# Patient Record
Sex: FEMALE | Race: WHITE | NOT HISPANIC OR LATINO | Employment: OTHER | URBAN - METROPOLITAN AREA
[De-identification: names, ages, dates, MRNs, and addresses within clinical notes are randomized per-mention and may not be internally consistent; named-entity substitution may affect disease eponyms.]

---

## 2017-01-18 DIAGNOSIS — F32.0 MILD MAJOR DEPRESSION (H): Primary | ICD-10-CM

## 2017-01-18 NOTE — TELEPHONE ENCOUNTER
Celexa      Last Written Prescription Date: 08/30/2016  Last Fill Quantity: 90, # refills: 0  Last Office Visit with [facility] primary care provider:  10/09/2015        Last PHQ-9 score on record=   PHQ-9 SCORE 5/26/2016   Total Score -   Total Score 0

## 2017-01-23 RX ORDER — CITALOPRAM HYDROBROMIDE 20 MG/1
20 TABLET ORAL DAILY
Qty: 30 TABLET | Refills: 0 | Status: SHIPPED | OUTPATIENT
Start: 2017-01-23 | End: 2017-02-06

## 2017-01-23 NOTE — TELEPHONE ENCOUNTER
Spoke to patient in regards to needing appointment. Patient states understanding. Assisted patient in scheduling appointment for physical/refills with Dr. Esparza 2/6/2016.      Mimi Cat, CMA

## 2017-01-24 NOTE — TELEPHONE ENCOUNTER
Medication is being filled for 1 time refill only due to:  Patient needs to be seen because it has been more than one year since last visit.   Barbie Foy RN

## 2017-02-06 ENCOUNTER — OFFICE VISIT (OUTPATIENT)
Dept: FAMILY MEDICINE | Facility: CLINIC | Age: 37
End: 2017-02-06
Payer: COMMERCIAL

## 2017-02-06 VITALS
DIASTOLIC BLOOD PRESSURE: 59 MMHG | HEART RATE: 84 BPM | BODY MASS INDEX: 21.07 KG/M2 | WEIGHT: 123.4 LBS | TEMPERATURE: 97 F | OXYGEN SATURATION: 98 % | SYSTOLIC BLOOD PRESSURE: 117 MMHG | HEIGHT: 64 IN

## 2017-02-06 DIAGNOSIS — Z23 NEED FOR PROPHYLACTIC VACCINATION AND INOCULATION AGAINST INFLUENZA: ICD-10-CM

## 2017-02-06 DIAGNOSIS — N63.15 BREAST LUMP ON RIGHT SIDE AT 12 O'CLOCK POSITION: ICD-10-CM

## 2017-02-06 DIAGNOSIS — N81.89 PELVIC FLOOR RELAXATION: ICD-10-CM

## 2017-02-06 DIAGNOSIS — Z12.4 CERVICAL CANCER SCREENING: ICD-10-CM

## 2017-02-06 DIAGNOSIS — F33.0 MAJOR DEPRESSIVE DISORDER, RECURRENT EPISODE, MILD (H): ICD-10-CM

## 2017-02-06 DIAGNOSIS — R53.83 FATIGUE, UNSPECIFIED TYPE: ICD-10-CM

## 2017-02-06 DIAGNOSIS — D22.5 ATYPICAL NEVUS OF BACK: ICD-10-CM

## 2017-02-06 DIAGNOSIS — L24.5 IRRITANT CONTACT DERMATITIS DUE TO OTHER CHEMICAL PRODUCTS: ICD-10-CM

## 2017-02-06 DIAGNOSIS — Z00.01 ENCOUNTER FOR GENERAL ADULT MEDICAL EXAMINATION WITH ABNORMAL FINDINGS: Primary | ICD-10-CM

## 2017-02-06 PROCEDURE — 87624 HPV HI-RISK TYP POOLED RSLT: CPT | Performed by: INTERNAL MEDICINE

## 2017-02-06 PROCEDURE — 99212 OFFICE O/P EST SF 10 MIN: CPT | Mod: 25 | Performed by: INTERNAL MEDICINE

## 2017-02-06 PROCEDURE — 99395 PREV VISIT EST AGE 18-39: CPT | Performed by: INTERNAL MEDICINE

## 2017-02-06 PROCEDURE — G0145 SCR C/V CYTO,THINLAYER,RESCR: HCPCS | Performed by: INTERNAL MEDICINE

## 2017-02-06 RX ORDER — TRIAMCINOLONE ACETONIDE 1 MG/G
CREAM TOPICAL
Qty: 80 G | Refills: 3 | Status: SHIPPED | OUTPATIENT
Start: 2017-02-06 | End: 2018-03-09

## 2017-02-06 RX ORDER — BETAMETHASONE DIPROPIONATE 0.5 MG/G
CREAM TOPICAL
Qty: 90 G | Refills: 1 | Status: CANCELLED | OUTPATIENT
Start: 2017-02-06

## 2017-02-06 RX ORDER — CITALOPRAM HYDROBROMIDE 20 MG/1
20 TABLET ORAL DAILY
Qty: 90 TABLET | Refills: 3 | Status: SHIPPED | OUTPATIENT
Start: 2017-02-06 | End: 2018-03-09

## 2017-02-06 NOTE — MR AVS SNAPSHOT
After Visit Summary   2/6/2017    Shruthi Bedoya    MRN: [id number]           Patient Information     Date Of Birth          1980        Visit Information        Provider Department      2/6/2017 6:15 PM Julianne Esparza MD [facility]        Today's Diagnoses     Routine general medical examination at a health care facility    -  1     Major depressive disorder, recurrent episode, mild (H)         Irritant contact dermatitis due to other chemical products         Need for prophylactic vaccination and inoculation against influenza         Pelvic floor relaxation         Cervical cancer screening         Fatigue, unspecified type         Breast lump on right side at 12 o'clock position         Atypical nevus of back           Care Instructions    For bone health you could take a Vitamin D supplement- 1000 MG over the counter.  Free weight and other weight bearing exercises are also good for bone health.  Schedule a mammogram/ultrasound with [facility] at [phone].  Schedule an appointment with Dermatology.      Preventive Health Recommendations  Female Ages 26 - 39  Yearly exam:   See your health care provider every year in order to    Review health changes.     Discuss preventive care.      Review your medicines if you your doctor has prescribed any.    Until age 30: Get a Pap test every three years (more often if you have had an abnormal result).    After age 30: Talk to your doctor about whether you should have a Pap test every 3 years or have a Pap test with HPV screening every 5 years.   You do not need a Pap test if your uterus was removed (hysterectomy) and you have not had cancer.  You should be tested each year for STDs (sexually transmitted diseases), if you're at risk.   Talk to your provider about how often to have your cholesterol checked.  If you are at risk for diabetes, you should have a diabetes test (fasting glucose).  Shots: Get a flu shot each year.  Get a tetanus shot every 10 years.   Nutrition:     Eat at least 5 servings of fruits and vegetables each day.    Eat whole-grain bread, whole-wheat pasta and brown rice instead of white grains and rice.    Talk to your provider about Calcium and Vitamin D.     Lifestyle    Exercise at least 150 minutes a week (30 minutes a day, 5 days of the week). This will help you control your weight and prevent disease.    Limit alcohol to one drink per day.    No smoking.     Wear sunscreen to prevent skin cancer.    See your dentist every six months for an exam and cleaning.    [facility]    If you have any questions regarding to your visit please contact your care team:     Team Pink:   Clinic Hours Telephone Number   Internal Medicine:  Dr. Julianne Monroy NP       7am-7pm  Monday - Thursday   7am-5pm  Fridays  [phone]  (Appointment scheduling available 24/7)    Questions about your visit?  Team Line  [phone]   Urgent Care - [place] and [place] - 11am-9pm Monday-Friday Saturday-Sunday- 9am-5pm   [place] - 5pm-9pm Monday-Friday Saturday-Sunday- 9am-5pm  [phone] - [place]   [phone] - [place]       What options do I have for visits at the clinic other than the traditional office visit?  To expand how we care for you, many of our providers are utilizing electronic visits (e-visits) and telephone visits, when medically appropriate, for interactions with their patients rather than a visit in the clinic.   We also offer nurse visits for many medical concerns. Just like any other service, we will bill your insurance company for this type of visit based on time spent on the phone with your provider. Not all insurance companies cover these visits. Please check with your medical insurance if this type of visit is covered. You will be responsible for any charges that are not paid by your insurance.      E-visits via Blippex:  generally incur  a $35.00 fee.  Telephone visits:  Time spent on the phone: *charged based on time that is spent on the phone in increments of 10 minutes. Estimated cost:   5-10 mins $30.00   11-20 mins. $59.00   21-30 mins. $85.00   Use Visiarchart (secure email communication and access to your chart) to send your primary care provider a message or make an appointment. Ask someone on your Team how to sign up for [email or web address].    For a Price Quote for your services, please call our Consumer Price Line at [phone].    As always, Thank you for trusting us with your health care needs!    Discharged by Gianna ALBERTS CMA ([facility])          Follow-ups after your visit        Additional Services     DERMATOLOGY REFERRAL       Your provider has referred you to: [facility]: [facility] [place] [phone]   [email or web address]  [facility] [phone]   [email or web address]  [place] (2 locations) [phone]   [email or web address]  [place] [phone]   [email or web address]    Please be aware that coverage of these services is subject to the terms and limitations of your health insurance plan.  Call member services at your health plan with any benefit or coverage questions.      Please bring the following with you to your appointment:    (1) Any X-Rays, CTs or MRIs which have been performed.  Contact the facility where they were done to arrange for  prior to your scheduled appointment.    (2) List of current medications  (3) This referral request   (4) Any documents/labs given to you for this referral            [facility] PT, HAND, AND CHIROPRACTIC REFERRAL       **This order will print in the [facility] Scheduling Office**    Physical Therapy, Hand Therapy and Chiropractic Care are available through:    *[facility]  *[facility] [place]  *[facility]    Call one number to schedule at any of the  above locations: [phone].    Your provider has referred you to: Physical Therapy at [facility] or [facility]    Indication/Reason for Referral: Women's Health (Please Complete Special Programs SmartList)  Onset of Illness:   Therapy Orders: Evaluate and Treat  Special Programs: Women's Health: Pelvic Floor Weakness:  and  Biofeedback and Strengthening  Special Request: as needed    Naima Mcnamara      Additional Comments for the Therapist or Chiropractor:     Please be aware that coverage of these services is subject to the terms and limitations of your health insurance plan.  Call member services at your health plan with any benefit or coverage questions.      Please bring the following to your appointment:    *Your personal calendar for scheduling future appointments  *Comfortable clothing                  Future tests that were ordered for you today     Open Future Orders        Priority Expected Expires Ordered    MA Diagnostic Digital Bilateral Routine  2/6/2018 2/6/2017    US Breast Right Limited 1-3 Quadrants Routine  2/6/2018 2/6/2017            Who to contact     If you have questions or need follow up information about today's clinic visit or your schedule please contact [facility] directly at [phone].  Normal or non-critical lab and imaging results will be communicated to you by MedAware Systemshart, letter or phone within 4 business days after the clinic has received the results. If you do not hear from us within 7 days, please contact the clinic through MedAware Systemshart or phone. If you have a critical or abnormal lab result, we will notify you by phone as soon as possible.  Submit refill requests through LEAFER or call your pharmacy and they will forward the refill request to us. Please allow 3 business days for your refill to be completed.          Additional Information About Your Visit        LEAFER Information     LEAFER gives you secure access to your electronic health record. If you see a primary care  "provider, you can also send messages to your care team and make appointments. If you have questions, please call your primary care clinic.  If you do not have a primary care provider, please call [phone] and they will assist you.        Care EveryWhere ID     This is your Care EveryWhere ID. This could be used by other organizations to access your [place] medical records  [id number]        Your Vitals Were     Pulse Temperature Height    84 97  F (36.1  C) (Oral) 5' 4\" (1.626 m)    BMI (Body Mass Index) Pulse Oximetry Last Period    21.17 kg/m2 98% 01/20/2017 (Approximate)    Breastfeeding?          No         Blood Pressure from Last 3 Encounters:   02/06/17 117/59   10/09/15 101/55   04/21/14 101/62    Weight from Last 3 Encounters:   02/06/17 123 lb 6.4 oz (55.974 kg)   10/09/15 120 lb (54.432 kg)   04/21/14 117 lb 3.2 oz (53.162 kg)              We Performed the Following     DEPRESSION ACTION PLAN (DAP) Order [[id number]]     DERMATOLOGY REFERRAL     HPV High Risk Types DNA Cervical     [facility] PT, HAND, AND CHIROPRACTIC REFERRAL     Pap imaged thin layer screen with HPV - recommended age 30 - 65 years (select HPV order below)          Today's Medication Changes          These changes are accurate as of: 2/6/17  7:02 PM.  If you have any questions, ask your nurse or doctor.               Stop taking these medicines if you haven't already. Please contact your care team if you have questions.     betamethasone dipropionate 0.05 % cream   Commonly known as:  DIPROSONE   Stopped by:  Julianne Esparza MD                Where to get your medicines      These medications were sent to [facility]/pharmacy #[id number] - [place], [place] - [address]  [address] [place], [place] [address]     Phone:  [phone]    - citalopram 20 MG tablet  - triamcinolone 0.1 % cream             Primary Care Provider Office Phone # Fax #    Julianne Esparza [phone]       [address] " [place] [address]        Thank you!     Thank you for choosing [facility]  for your care. Our goal is always to provide you with excellent care. Hearing back from our patients is one way we can continue to improve our services. Please take a few minutes to complete the written survey that you may receive in the mail after your visit with us. Thank you!             Your Updated Medication List - Protect others around you: Learn how to safely use, store and throw away your medicines at www.disposemymeds.org.          This list is accurate as of: 2/6/17  7:02 PM.  Always use your most recent med list.                   Brand Name Dispense Instructions for use    citalopram 20 MG tablet    celeXA    90 tablet    Take 1 tablet (20 mg) by mouth daily       triamcinolone 0.1 % cream    KENALOG    80 g    Apply sparingly to affected area three times daily as needed       TYLENOL 325 MG tablet   Generic drug:  acetaminophen      2 TABLETS EVERY 4 HOURS AS NEEDED

## 2017-02-06 NOTE — LETTER
[facility]  [address] [place] [address]  [place] [address]  [phone]      February 14, 2017    Shruthi Bedoya  [address] [place] [id number]    Dear Shruthi,  We are happy to inform you that your PAP smear result from 2/6/17 is normal.  We are now able to do a follow up test on PAP smears. The DNA test is for HPV (Human Papilloma Virus). Cervical cancer is closely linked with certain types of HPV. Your result showed no evidence of high risk HPV.  Therefore we recommend you return in 3 years for your next pap smear and HPV test.  You will still need to return to the clinic every year for an annual exam and other preventive tests.  Please contact the clinic with any questions.  Sincerely,  Julianne Esparza MD/cherry

## 2017-02-07 ASSESSMENT — PATIENT HEALTH QUESTIONNAIRE - PHQ9: SUM OF ALL RESPONSES TO PHQ QUESTIONS 1-9: 0

## 2017-02-07 NOTE — PROGRESS NOTES
INTERNAL MEDICINE     SUBJECTIVE:     CC: Shruthi Bedoya is an 36 year old woman who presents for preventive health visit.     Healthy Habits:    Do you get at least three servings of calcium containing foods daily (dairy, green leafy vegetables, etc.)? no    Amount of exercise or daily activities, outside of work: none     Problems taking medications regularly No    Medication side effects: No    Have you had an eye exam in the past two years? yes    Do you see a dentist twice per year? yes    Do you have sleep apnea, excessive snoring or daytime drowsiness?no      Osteoporosis - Mom was diagnosed with osteoporosis, curious what she can take preventative wise? She is not doing intentional exercise but is on her feet a lot at work. She has cut back on soda significantly and her stomach feels a lot better without it.     Eczema - Her eczema is acting up again on her hands and legs, she would like a refill on the cream she previously was prescribed. She knows work causes it to flare up with all the hair dye and chemicals.     Bladder control - Her bladder control remains the same, she was unable to follow up with therapy. Notes she still has pain. She would like the option to consult a therapist but needs to think on it.    Notes  - Patient has a lump on her breast.  Last time it was a cyst but would just like to double check.   - Denies any abnormal vaginal bleeding.   - Feels increased fatigue.         Today's PHQ-2 Score:   PHQ-2 ( 1999 Pf[employer]) 2/6/2017 10/9/2015   Q1: Little interest or pleasure in doing things 0 0   Q2: Feeling down, depressed or hopeless 0 0   PHQ-2 Score 0 0     Abuse: Current or Past(Physical, Sexual or Emotional)- No  Do you feel safe in your environment - Yes    Social History   Substance Use Topics     Smoking status: Former Smoker     Types: Cigarettes     Quit date: 04/06/2016     Smokeless tobacco: Never Used      Comment: 6 cigarettes daily (10/9/15)     Alcohol Use: No     The  patient does not drink >3 drinks per day nor >7 drinks per week.    Recent Labs   Lab Test  10/03/14   1017   CHOL  150   HDL  49*   LDL  91   TRIG  51   CHOLHDLRATIO  3.1   Reviewed orders with patient.  Reviewed health maintenance and updated orders accordingly - Yes    Mammo Decision Support:  Mammogram not appropriate for this patient based on age.  Pertinent mammograms are reviewed under the imaging tab.  History of abnormal Pap smear: YES - age 30- 65 PAP every 3 years recommended  Last 3 Pap Results:   PAP (no units)   Date Value   04/21/2014 NIL   05/02/2011 NIL   All Histories reviewed and updated in Epic.      ROS:  C: NEGATIVE for fever, chills, change in weight  I: NEGATIVE for worrisome rashes, moles or lesions. POSITIVE for eczema on her hands  E: NEGATIVE for vision changes or irritation  ENT: NEGATIVE for ear, mouth and throat problems  R: NEGATIVE for significant cough or SOB  B: NEGATIVE for masses, tenderness or discharge. POSITIVE for breast lump  CV: NEGATIVE for chest pain, palpitations or peripheral edema  GI: NEGATIVE for nausea, abdominal pain, heartburn, or change in bowel habits  : NEGATIVE for unusual urinary or vaginal symptoms. Periods are regular. POSITIVE for mixed incontinence urge and stress   M: NEGATIVE for significant arthralgias or myalgia  N: NEGATIVE for weakness, dizziness or paresthesias  P: NEGATIVE for changes in mood or affect    This document serves as a record of the services and decisions personally performed and made by Julianne Esparza MD. It was created on his/her behalf by Lyubov Pacheco, trained medical scribe. The creation of this document is based the provider's statements to the medical scribes.    Scribjulio cesar Pacheco 6:26 PM, February 6, 2017    Problem list, Medication list, Allergies, and Medical/Social/Surgical histories reviewed in [facility] and updated as appropriate.  Labs reviewed in EPIC  OBJECTIVE:     /59 mmHg  Pulse 84  Temp(Src) 97  F (36.1  " C) (Oral)  Ht 1.626 m (5' 4\")  Wt 55.974 kg (123 lb 6.4 oz)  BMI 21.17 kg/m2  SpO2 98%  LMP 01/20/2017 (Approximate)  Breastfeeding? No  EXAM:   GENERAL: healthy, alert and no distress  EYES: Eyes grossly normal to inspection, PERRL and conjunctivae and sclerae normal  HENT: ear canals and TM's normal, nose and mouth without ulcers or lesions  NECK: no adenopathy, no asymmetry, masses, or scars and thyroid normal to palpation  RESP: lungs clear to auscultation - no rales, rhonchi or wheezes  BREAST: without tenderness or nipple discharge and no palpable axillary masses or adenopathy on the left . Firm ridge at the 12 o'clock position under neath the nipple on the right side  CV: regular rate and rhythm, normal S1 S2, no S3 or S4, no murmur, click or rub, no peripheral edema and peripheral pulses strong  ABDOMEN: soft, nontender, no hepatosplenomegaly, no masses and bowel sounds normal   (female): normal female external genitalia, normal urethral meatus, vaginal mucosa pink, moist, well rugated, and normal cervix/adnexa/uterus without masses or discharge  erythema of the cervix with post operative changes  MS: no gross musculoskeletal defects noted, no edema  SKIN: no suspicious lesions or rashes. Erythematous papules and plaques on the right lower leg and bilateral hands. Atypical nevous on her mid back- black and oblong  NEURO: Normal strength and tone, mentation intact and speech normal  PSYCH: mentation appears normal, affect normal/bright    ASSESSMENT/PLAN:     1. Routine general medical examination at a health care facility  -- PAP     2. Major depressive disorder, recurrent episode, mild (H)  Well controlled. The current medical regimen is effective;  continue present plan and medications.  - citalopram (CELEXA) 20 MG tablet; Take 1 tablet (20 mg) by mouth daily  Dispense: 90 tablet; Refill: 3    3. Irritant contact dermatitis due to other chemical products  She has eczema on her bilateral hands and " "right leg. Prescribed triamcinolone cream.   - triamcinolone (KENALOG) 0.1 % cream; Apply sparingly to affected area three times daily as needed  Dispense: 80 g; Refill: 3    5. Pelvic floor relaxation  Patient never followed up with therapy for her mixed urinary incontinence/stress/urge. She expressed she would like the option to do so in the future.  - [facility] PT, HAND, AND CHIROPRACTIC REFERRAL    6. Cervical cancer screening  Hx of abnormal PAP. HPV screening today with PAP  - Pap imaged thin layer screen with HPV - recommended age 30 - 65 years (select HPV order below)  - HPV High Risk Types DNA Cervical    7. Fatigue, unspecified type  Offered labs but patient declined at this time.  Would do hemoglobin and tsh if patient reconsiders.    8. Breast lump on right side at 12 o'clock position  She will schedule a mammogram for further assessment and diagnosis.   - MA Diagnostic Digital Bilateral; Future  - US Breast Right Limited 1-3 Quadrants; Future    9. Atypical nevus of back  She will schedule with derm for further assessment and diagnosis. I advised this at her last physical but she didn't go.  Differential includes melanoma and patient is aware.  - DERMATOLOGY REFERRAL      COUNSELING:   Reviewed preventive health counseling, as reflected in patient instructions       Regular exercise       Healthy diet/nutrition       Osteoporosis Prevention/Bone Health   reports that she quit smoking about 10 months ago. Her smoking use included Cigarettes. She has never used smokeless tobacco.  Estimated body mass index is 21.17 kg/(m^2) as calculated from the following:    Height as of this encounter: 1.626 m (5' 4\").    Weight as of this encounter: 55.974 kg (123 lb 6.4 oz).     Counseling Resources:  ATP IV Guidelines  Pooled Cohorts Equation Calculator  Breast Cancer Risk Calculator  FRAX Risk Assessment  ICSI Preventive Guidelines  Dietary Guidelines for Americans, 2010  USDA's MyPlate  ASA Prophylaxis  Lung CA " Screening    Patient Instructions     For bone health you could take a Vitamin D supplement- 1000 MG over the counter.  Free weight and other weight bearing exercises are also good for bone health.  Schedule a mammogram/ultrasound with [facility] at [phone].  Schedule an appointment with Dermatology.      Preventive Health Recommendations  Female Ages 26 - 39  Yearly exam:   See your health care provider every year in order to    Review health changes.     Discuss preventive care.      Review your medicines if you your doctor has prescribed any.    Until age 30: Get a Pap test every three years (more often if you have had an abnormal result).    After age 30: Talk to your doctor about whether you should have a Pap test every 3 years or have a Pap test with HPV screening every 5 years.   You do not need a Pap test if your uterus was removed (hysterectomy) and you have not had cancer.  You should be tested each year for STDs (sexually transmitted diseases), if you're at risk.   Talk to your provider about how often to have your cholesterol checked.  If you are at risk for diabetes, you should have a diabetes test (fasting glucose).  Shots: Get a flu shot each year. Get a tetanus shot every 10 years.   Nutrition:     Eat at least 5 servings of fruits and vegetables each day.    Eat whole-grain bread, whole-wheat pasta and brown rice instead of white grains and rice.    Talk to your provider about Calcium and Vitamin D.     Lifestyle    Exercise at least 150 minutes a week (30 minutes a day, 5 days of the week). This will help you control your weight and prevent disease.    Limit alcohol to one drink per day.    No smoking.     Wear sunscreen to prevent skin cancer.    See your dentist every six months for an exam and cleaning.    [facility]    If you have any questions regarding to your visit please contact your care team:     Team Pink:   Clinic Hours Telephone Number   Internal  Medicine:  Dr. Julianne Monroy, NP       7am-7pm  Monday - Thursday   7am-5pm  Fridays  [phone]  (Appointment scheduling available 24/7)    Questions about your visit?  Team Line  [phone]   Urgent Care - [place] and [facility] - 11am-9pm Monday-Friday Saturday-Sunday- 9am-5pm   [place] - 5pm-9pm Monday-Friday Saturday-Sunday- 9am-5pm  [phone] - [place]   [phone] - [place]       What options do I have for visits at the clinic other than the traditional office visit?  To expand how we care for you, many of our providers are utilizing electronic visits (e-visits) and telephone visits, when medically appropriate, for interactions with their patients rather than a visit in the clinic.   We also offer nurse visits for many medical concerns. Just like any other service, we will bill your insurance company for this type of visit based on time spent on the phone with your provider. Not all insurance companies cover these visits. Please check with your medical insurance if this type of visit is covered. You will be responsible for any charges that are not paid by your insurance.      E-visits via [employer]:  generally incur a $35.00 fee.  Telephone visits:  Time spent on the phone: *charged based on time that is spent on the phone in increments of 10 minutes. Estimated cost:   5-10 mins $30.00   11-20 mins. $59.00   21-30 mins. $85.00   Use [employer] (secure email communication and access to your chart) to send your primary care provider a message or make an appointment. Ask someone on your Team how to sign up for [employer].    For a Price Quote for your services, please call our Consumer Price Line at [phone].    As always, Thank you for trusting us with your health care needs!    Discharged by Gianna ALBERTS CMA ([facility])          I spent 26 minutes of time with the patient and >50% of it was in education and counseling regarding preventative health.      The information in this document, created by the medical scribe for me, accurately reflects the services I personally performed and the decisions made by me. I have reviewed and approved this document for accuracy prior to leaving the patient care area.  Julianne Esparza MD  6:28 PM, 02/06/2017    Julianne Esparza MD  [facility]    Start 6:35 PM  End 7:01 PM

## 2017-02-07 NOTE — PATIENT INSTRUCTIONS
For bone health you could take a Vitamin D supplement- 1000 MG over the counter.  Free weight and other weight bearing exercises are also good for bone health.  Schedule a mammogram/ultrasound with [facility] at [phone].  Schedule an appointment with Dermatology.      Preventive Health Recommendations  Female Ages 26 - 39  Yearly exam:   See your health care provider every year in order to    Review health changes.     Discuss preventive care.      Review your medicines if you your doctor has prescribed any.    Until age 30: Get a Pap test every three years (more often if you have had an abnormal result).    After age 30: Talk to your doctor about whether you should have a Pap test every 3 years or have a Pap test with HPV screening every 5 years.   You do not need a Pap test if your uterus was removed (hysterectomy) and you have not had cancer.  You should be tested each year for STDs (sexually transmitted diseases), if you're at risk.   Talk to your provider about how often to have your cholesterol checked.  If you are at risk for diabetes, you should have a diabetes test (fasting glucose).  Shots: Get a flu shot each year. Get a tetanus shot every 10 years.   Nutrition:     Eat at least 5 servings of fruits and vegetables each day.    Eat whole-grain bread, whole-wheat pasta and brown rice instead of white grains and rice.    Talk to your provider about Calcium and Vitamin D.     Lifestyle    Exercise at least 150 minutes a week (30 minutes a day, 5 days of the week). This will help you control your weight and prevent disease.    Limit alcohol to one drink per day.    No smoking.     Wear sunscreen to prevent skin cancer.    See your dentist every six months for an exam and cleaning.    [facility]    If you have any questions regarding to your visit please contact your care team:     Team Pink:   Clinic Hours Telephone Number   Internal Medicine:  Dr. Julianne Canela  Zoraida Monroy NP       7am-7pm  Monday - Thursday   7am-5pm  Fridays  [phone]  (Appointment scheduling available 24/7)    Questions about your visit?  Team Line  [phone]   Urgent Care - [place] - 11am-9pm Monday-Friday Saturday-Sunday- 9am-5pm   [place] - 5pm-9pm Monday-Friday Saturday-Sunday- 9am-5pm  [phone] - [place]   [phone] - [place]       What options do I have for visits at the clinic other than the traditional office visit?  To expand how we care for you, many of our providers are utilizing electronic visits (e-visits) and telephone visits, when medically appropriate, for interactions with their patients rather than a visit in the clinic.   We also offer nurse visits for many medical concerns. Just like any other service, we will bill your insurance company for this type of visit based on time spent on the phone with your provider. Not all insurance companies cover these visits. Please check with your medical insurance if this type of visit is covered. You will be responsible for any charges that are not paid by your insurance.      E-visits via [employer]:  generally incur a $35.00 fee.  Telephone visits:  Time spent on the phone: *charged based on time that is spent on the phone in increments of 10 minutes. Estimated cost:   5-10 mins $30.00   11-20 mins. $59.00   21-30 mins. $85.00   Use [employer] (secure email communication and access to your chart) to send your primary care provider a message or make an appointment. Ask someone on your Team how to sign up for [employer].    For a Price Quote for your services, please call our Consumer Price Line at [phone].    As always, Thank you for trusting us with your health care needs!    Discharged by Gianna ALBERTS CMA ([facility])

## 2017-02-07 NOTE — NURSING NOTE
"Chief Complaint   Patient presents with     Physical       Initial /59 mmHg  Pulse 84  Temp(Src) 97  F (36.1  C) (Oral)  Ht 5' 4\" (1.626 m)  Wt 123 lb 6.4 oz (55.974 kg)  BMI 21.17 kg/m2  SpO2 98%  LMP 01/20/2017 (Approximate)  Breastfeeding? No Estimated body mass index is 21.17 kg/(m^2) as calculated from the following:    Height as of this encounter: 5' 4\" (1.626 m).    Weight as of this encounter: 123 lb 6.4 oz (55.974 kg).  Medication Reconciliation: complete   Gianna ALBERTS CMA ([facility])      "

## 2017-02-08 LAB
COPATH REPORT: NORMAL
PAP: NORMAL

## 2017-02-10 ENCOUNTER — TRANSFERRED RECORDS (OUTPATIENT)
Dept: HEALTH INFORMATION MANAGEMENT | Facility: CLINIC | Age: 37
End: 2017-02-10

## 2017-02-13 LAB
FINAL DIAGNOSIS: NORMAL
HPV HR 12 DNA CVX QL NAA+PROBE: NEGATIVE
HPV16 DNA SPEC QL NAA+PROBE: NEGATIVE
HPV18 DNA SPEC QL NAA+PROBE: NEGATIVE
SPECIMEN DESCRIPTION: NORMAL

## 2017-03-06 DIAGNOSIS — F32.0 MILD MAJOR DEPRESSION (H): ICD-10-CM

## 2017-03-07 NOTE — TELEPHONE ENCOUNTER
Last Written Prescription Date  Last Fill Quantity: ***, # refills: ***  [facility] Office Visit with [facility] [place] care provider:  ***        Last PHQ-9 score on record=   PHQ-9 SCORE 2/6/2017   Total Score -   Total Score 0

## 2017-03-08 RX ORDER — CITALOPRAM HYDROBROMIDE 20 MG/1
TABLET ORAL
Qty: 30 TABLET | Refills: 0 | OUTPATIENT
Start: 2017-03-08

## 2018-02-26 ENCOUNTER — TELEPHONE (OUTPATIENT)
Dept: INTERNAL MEDICINE | Facility: CLINIC | Age: 38
End: 2018-02-26

## 2018-02-26 NOTE — TELEPHONE ENCOUNTER
Per Dr. Esparza: no labs needed at this time.     Patient updated.  Lab appointment cancelled. She will still keep her physical appointment    Juan Francisco Mckenna RN

## 2018-02-26 NOTE — TELEPHONE ENCOUNTER
Patient would like to discuss weaning off of Citalopram to see how she does without the med.    Noted that she has not been seen for over 1 year.  Advised her to schedule    Physical scheduled for 3/9/18 and she will ask provider about citalopram then  She has the day off and would like to do fasting labs earlier that same day, then she will come back for the OV as she did not want to fast until the OV    Please place any previsit labs needed.    Juan Francisco Mckenna RN

## 2018-02-26 NOTE — TELEPHONE ENCOUNTER
Reason for call:  Medication   If this is a refill request, has the caller requested the refill from the pharmacy already?   Will the patient be using a [facility]?   Name of the pharmacy and phone number for the current request:     Name of the medication requested:Citalapram    Other request Patient has questions about medicine.    Phone number to reach patient:  Cell number on file:    Telephone Information:   Mobile [phone]       Best Time:  Any      Can we leave a detailed message on this number?  YES  Carolyn Portillo,

## 2018-03-09 ENCOUNTER — OFFICE VISIT (OUTPATIENT)
Dept: INTERNAL MEDICINE | Facility: CLINIC | Age: 38
End: 2018-03-09
Payer: COMMERCIAL

## 2018-03-09 VITALS
OXYGEN SATURATION: 96 % | TEMPERATURE: 97.5 F | WEIGHT: 123.2 LBS | BODY MASS INDEX: 20.53 KG/M2 | RESPIRATION RATE: 16 BRPM | SYSTOLIC BLOOD PRESSURE: 100 MMHG | DIASTOLIC BLOOD PRESSURE: 60 MMHG | HEIGHT: 65 IN | HEART RATE: 90 BPM

## 2018-03-09 DIAGNOSIS — F32.0 MILD MAJOR DEPRESSION (H): ICD-10-CM

## 2018-03-09 DIAGNOSIS — L24.5 IRRITANT CONTACT DERMATITIS DUE TO OTHER CHEMICAL PRODUCTS: ICD-10-CM

## 2018-03-09 DIAGNOSIS — Z86.39 HISTORY OF IRON DEFICIENCY: ICD-10-CM

## 2018-03-09 DIAGNOSIS — R53.83 FATIGUE, UNSPECIFIED TYPE: ICD-10-CM

## 2018-03-09 DIAGNOSIS — Z00.00 ROUTINE GENERAL MEDICAL EXAMINATION AT A HEALTH CARE FACILITY: Primary | ICD-10-CM

## 2018-03-09 PROCEDURE — 99395 PREV VISIT EST AGE 18-39: CPT | Performed by: INTERNAL MEDICINE

## 2018-03-09 RX ORDER — TRIAMCINOLONE ACETONIDE 1 MG/G
CREAM TOPICAL
Qty: 80 G | Refills: 3 | Status: SHIPPED | OUTPATIENT
Start: 2018-03-09 | End: 2018-12-03

## 2018-03-09 ASSESSMENT — PAIN SCALES - GENERAL: PAINLEVEL: NO PAIN (0)

## 2018-03-09 NOTE — NURSING NOTE
"Chief Complaint   Patient presents with     Physical     Discuss Citalopram, patient is not fasting     Health Maintenance     DAP/PHQ9       Initial /60 (BP Location: Left arm, Cuff Size: Adult Regular)  Pulse 90  Temp 97.5  F (36.4  C) (Oral)  Resp 16  Ht 5' 4.75\" (1.645 m)  Wt 123 lb 3.2 oz (55.9 kg)  LMP 03/02/2018 (Approximate)  SpO2 96%  Breastfeeding? No  BMI 20.66 kg/m2 Estimated body mass index is 20.66 kg/(m^2) as calculated from the following:    Height as of this encounter: 5' 4.75\" (1.645 m).    Weight as of this encounter: 123 lb 3.2 oz (55.9 kg).  Medication Reconciliation: complete       Mimi Cat CMA    "

## 2018-03-09 NOTE — PATIENT INSTRUCTIONS
Cut citalopram to half a tablet for a couple weeks. After that, see if you can stop. If you have withdrawal, take it half tab every other day.    Schedule a fasting lab.    Yearly mammograms (3D).    [facility]    If you have any questions regarding to your visit please contact your care team:     Team Pink:   Clinic Hours Telephone Number   Internal Medicine:  Dr. Julianne Monroy, NP       7am-7pm  Monday - Thursday   7am-5pm  Fridays  [phone]  (Appointment scheduling available 24/7)    Questions about your visit?  Team Line  [phone]   [facility] [address] [facility] - 11am-9pm Monday-Friday Saturday-Sunday- 9am-5pm   [place] - 5pm-9pm Monday-Friday Saturday-Sunday- 9am-5pm  [phone] - [place]   [phone] - [place]       What options do I have for visits at the clinic other than the traditional office visit?  To expand how we care for you, many of our providers are utilizing electronic visits (e-visits) and telephone visits, when medically appropriate, for interactions with their patients rather than a visit in the clinic.   We also offer nurse visits for many medical concerns. Just like any other service, we will bill your insurance company for this type of visit based on time spent on the phone with your provider. Not all insurance companies cover these visits. Please check with your medical insurance if this type of visit is covered. You will be responsible for any charges that are not paid by your insurance.      E-visits via [employer]:  generally incur a $35.00 fee.  Telephone visits:  Time spent on the phone: *charged based on time that is spent on the phone in increments of 10 minutes. Estimated cost:   5-10 mins $30.00   11-20 mins. $59.00   21-30 mins. $85.00   Use [employer] (secure email communication and access to your chart) to send your primary care provider a message or make an appointment. Ask someone on your  Team how to sign up for [employer].    For a Price Quote for your services, please call our Consumer Price Line at [phone].    As always, Thank you for trusting us with your health care needs!    Comfort TORRES CMA ([facility])

## 2018-03-09 NOTE — PROGRESS NOTES
INTERNAL MEDICINE   SUBJECTIVE:   CC: Shruthi Bedoya is an 37 year old woman who presents for preventive health visit.     Physical   Annual:     Getting at least 3 servings of Calcium per day::  NO    Bi-annual eye exam::  Yes    Dental care twice a year::  Yes    Sleep apnea or symptoms of sleep apnea::  Daytime drowsiness    Diet::  Regular (no restrictions)    Frequency of exercise::  None    Taking medications regularly::  Yes    Medication side effects::  None    Additional concerns today::  No          Answers for HPI/ROS submitted by the patient on 3/9/2018   PHQ-2 Score: 0    Depression - Her depression has not prevented her from work. Citalopram has been controlling her depression for a long time since she was young. Wants to get off of citalopram. She has trouble focusing and wants to know if stopping it will help. She is afraid of having withdrawal.    Eczema - Sometimes she scratches her legs because they are so itchy that they bleed. Started using a Cetaphil wash and uses Vanicream daily. Notices itching is the worse after she shaves. Gets some itching on her arms too but mostly on her legs.    Exercise - Starting to do squats but not a lot of cardio.    Shoulder pain - It has been one week since she started having sharp shoulder pain so she wants to see if it will improve on its own. She thinks she needs to see a chiropractor or get a massage.      Additional notes:  She feels tired a lot and has been losing more hair  Denies vaginal discharge or pain  She has been taking bone broth with collagen      Chief Complaint   Patient presents with     Physical     Discuss Citalopram, patient is not fasting     Health Maintenance     DAP/PHQ9     Today's PHQ-2 Score:   PHQ-2 ( 1999 Pfizer) 3/9/2018   Q1: Little interest or pleasure in doing things 0   Q2: Feeling down, depressed or hopeless 0   PHQ-2 Score 0   Q1: Little interest or pleasure in doing things Not at all   Q2: Feeling down, depressed or  "hopeless Not at all   PHQ-2 Score 0     Abuse: Current or Past(Physical, Sexual or Emotional)- No  Do you feel safe in your environment - Yes    Social History   Substance Use Topics     Smoking status: Former Smoker     Types: Cigarettes     Quit date: 4/6/2016     Smokeless tobacco: Never Used      Comment: 6 cigarettes daily (10/9/15)     Alcohol use No     No flowsheet data found.No flowsheet data found.    Reviewed orders with patient.  Reviewed health maintenance and updated orders accordingly - Yes  Labs reviewed in [facility]    Mammogram not appropriate for this patient based on age.  Pertinent mammograms are reviewed under the imaging tab.  History of abnormal Pap smear: YES - updated in Problem List and Health Maintenance accordingly    Reviewed and updated as needed this visit by clinical staff  Tobacco  Allergies  Meds  Med Hx  Surg Hx  Fam Hx  Soc Hx        Reviewed and updated as needed this visit by Provider          Review of Systems  ROS: 10 point ROS neg other than the symptoms noted above in the HPI.    This document serves as a record of the services and decisions personally performed and made by Julianne Esparza MD. It was created on his/her behalf by Selene Borges trained medical scribe. The creation of this document is based the provider's statements to the medical scribes.    Nadia Borges 12:33 PM, March 9, 2018    OBJECTIVE:   /60 (BP Location: Left arm, Cuff Size: Adult Regular)  Pulse 90  Temp 97.5  F (36.4  C) (Oral)  Resp 16  Ht 1.645 m (5' 4.75\")  Wt 55.9 kg (123 lb 3.2 oz)  LMP 03/02/2018 (Approximate)  SpO2 96%  Breastfeeding? No  BMI 20.66 kg/m2  Physical Exam  GENERAL: healthy, alert and no distress  EYES: Eyes grossly normal to inspection, PERRL and conjunctivae and sclerae normal  HENT: ear canals and TM's normal, nose and mouth without ulcers or lesions  NECK: no adenopathy, no asymmetry, masses, or scars and thyroid normal to palpation  RESP: lungs clear to " "auscultation - no rales, rhonchi or wheezes  BREAST: normal without masses, tenderness or nipple discharge and no palpable axillary masses or adenopathy  CV: regular rate and rhythm, normal S1 S2, no S3 or S4, no murmur, click or rub, no peripheral edema and peripheral pulses strong  ABDOMEN: soft, nontender, no hepatosplenomegaly, no masses and bowel sounds normal  MS: no gross musculoskeletal defects noted, no edema, full range of motion right shoulder  SKIN: no suspicious lesions or rashes  NEURO: Normal strength and tone, mentation intact and speech normal  PSYCH: mentation appears normal, affect normal/bright    ASSESSMENT/PLAN:   1. Irritant contact dermatitis due to other chemical products  Controlled. She still has itching but continues with triamcinolone, Cetaphil, and Vanicream.  - triamcinolone (KENALOG) 0.1 % cream; Apply sparingly to affected area three times daily as needed  Dispense: 80 g; Refill: 3    2. Routine general medical examination at a health care facility    - Lipid panel reflex to direct LDL Fasting; Future  - **Glucose FUTURE anytime; Future    3. Mild major depression (H)  Stable. Patient will start tapering off of citalopram.    4. History of iron deficiency    - **Hemoglobin FUTURE anytime; Future  - Ferritin; Future    5. Fatigue, unspecified type    - **Hemoglobin FUTURE anytime; Future  - **TSH with free T4 reflex FUTURE anytime; Future  - Ferritin; Future    COUNSELING:  Reviewed preventive health counseling, as reflected in patient instructions  Special attention given to:        Regular exercise       Healthy diet/nutrition       reports that she quit smoking about 23 months ago. Her smoking use included Cigarettes. She has never used smokeless tobacco.  Estimated body mass index is 20.66 kg/(m^2) as calculated from the following:    Height as of this encounter: 1.645 m (5' 4.75\").    Weight as of this encounter: 55.9 kg (123 lb 3.2 oz).       Counseling Resources:  ATP IV " Guidelines  Pooled Cohorts Equation Calculator  Breast Cancer Risk Calculator  FRAX Risk Assessment  ICSI Preventive Guidelines  Dietary Guidelines for Americans, 2010  USDA's MyPlate  ASA Prophylaxis  Lung CA Screening      Patient Instructions     Cut citalopram to half a tablet for a couple weeks. After that, see if you can stop. If you have withdrawal, take it half tab every other day.    Schedule a fasting lab.    Yearly mammograms (3D).    [facility]    If you have any questions regarding to your visit please contact your care team:     Team Pink:   Clinic Hours Telephone Number   Internal Medicine:  Dr. Julianne Monroy, NP       7am-7pm  Monday - Thursday   7am-5pm  Fridays  [phone]  (Appointment scheduling available 24/7)    Questions about your visit?  Team Line  [phone]   Ur[facility] - [facility] - 11am-9pm Monday-Friday Saturday-Sunday- 9am-5pm   [place] - 5pm-9pm Monday-Friday Saturday-Sunday- 9am-5pm  [phone] - [place]   [phone] - [place]       What options do I have for visits at the clinic other than the traditional office visit?  To expand how we care for you, many of our providers are utilizing electronic visits (e-visits) and telephone visits, when medically appropriate, for interactions with their patients rather than a visit in the clinic.   We also offer nurse visits for many medical concerns. Just like any other service, we will bill your insurance company for this type of visit based on time spent on the phone with your provider. Not all insurance companies cover these visits. Please check with your medical insurance if this type of visit is covered. You will be responsible for any charges that are not paid by your insurance.      E-visits via Artist Growth:  generally incur a $35.00 fee.  Telephone visits:  Time spent on the phone: *charged based on time that is spent on the phone in increments  of 10 minutes. Estimated cost:   5-10 mins $30.00   11-20 mins. $59.00   21-30 mins. $85.00   Use MyChart (secure email communication and access to your chart) to send your primary care provider a message or make an appointment. Ask someone on your Team how to sign up for Yingying Licaihart.    For a Price Quote for your services, please call our Consumer Price Line at [phone].    As always, Thank you for trusting us with your health care needs!    Comfort TORRES CMA ([facility])      I spent 15 minutes of time with the patient and >50% of it was in education and counseling regarding preventive health.    The information in this document, created by the medical scribe, Selene Borges, for me, accurately reflects the services I personally performed and the decisions made by me. I have reviewed and approved this document for accuracy prior to leaving the patient care area.    Julianne Esparza MD  [facility]    Start 12:29 PM  End 12:44 PM

## 2018-03-09 NOTE — MR AVS SNAPSHOT
After Visit Summary   3/9/2018    Shruthi Bedoya    MRN: [id number]           Patient Information     Date Of Birth          1980        Visit Information        Provider Department      3/9/2018 12:00 PM Julianne Esparza MD [facility]        Today's Diagnoses     Routine general medical examination at a health care facility    -  1    Irritant contact dermatitis due to other chemical products        Mild major depression (H)        History of iron deficiency        Fatigue, unspecified type          Care Instructions    Cut citalopram to half a tablet for a couple weeks. After that, see if you can stop. If you have withdrawal, take it half tab every other day.    Schedule a fasting lab.    Yearly mammograms (3D).    [facility]    If you have any questions regarding to your visit please contact your care team:     Team Pink:   Clinic Hours Telephone Number   Internal Medicine:  Dr. Julianne Monroy, NP       7am-7pm  Monday - Thursday   7am-5pm  Fridays  [phone]  (Appointment scheduling available 24/7)    Questions about your visit?  Team Line  [phone]   Urgent Care - [place] [facility] [place] [facility] - 11am-9pm Monday-Friday Saturday-Sunday- 9am-5pm   [place] - 5pm-9pm Monday-Friday Saturday-Sunday- 9am-5pm  [phone] - [place]   [phone] - [place]       What options do I have for visits at the clinic other than the traditional office visit?  To expand how we care for you, many of our providers are utilizing electronic visits (e-visits) and telephone visits, when medically appropriate, for interactions with their patients rather than a visit in the clinic.   We also offer nurse visits for many medical concerns. Just like any other service, we will bill your insurance company for this type of visit based on time spent on the phone with your provider. Not all insurance companies cover these visits. Please  check with your medical insurance if this type of visit is covered. You will be responsible for any charges that are not paid by your insurance.      E-visits via Mobstatshart:  generally incur a $35.00 fee.  Telephone visits:  Time spent on the phone: *charged based on time that is spent on the phone in increments of 10 minutes. Estimated cost:   5-10 mins $30.00   11-20 mins. $59.00   21-30 mins. $85.00   Use Telkonet (secure email communication and access to your chart) to send your primary care provider a message or make an appointment. Ask someone on your Team how to sign up for Telkonet.    For a Price Quote for your services, please call our Bitcast Line at [phone].    As always, Thank you for trusting us with your health care needs!    Comfort TORRES CMA ([facility])            Follow-ups after your visit        Follow-up notes from your care team     Return in about 1 year (around 3/9/2019).      Future tests that were ordered for you today     Open Future Orders        Priority Expected Expires Ordered    Lipid panel reflex to direct LDL Fasting Routine 3/9/2018 3/9/2019 3/9/2018    **Glucose FUTURE anytime Routine 3/9/2018 3/9/2019 3/9/2018    **Hemoglobin FUTURE anytime Routine 3/9/2018 3/9/2019 3/9/2018    **TSH with free T4 reflex FUTURE anytime Routine 3/9/2018 3/9/2019 3/9/2018    Ferritin Routine  3/9/2019 3/9/2018            Who to contact     If you have questions or need follow up information about today's clinic visit or your schedule please contact [facility] directly at [phone].  Normal or non-critical lab and imaging results will be communicated to you by MyChart, letter or phone within 4 business days after the clinic has received the results. If you do not hear from us within 7 days, please contact the clinic through Mobstatshart or phone. If you have a critical or abnormal lab result, we will notify you by phone as soon as possible.  Submit refill requests through Mobstatshart or call  "your pharmacy and they will forward the refill request to us. Please allow 3 business days for your refill to be completed.          Additional Information About Your Visit        Fourandhalfhart Information     Tekora gives you secure access to your electronic health record. If you see a primary care provider, you can also send messages to your care team and make appointments. If you have questions, please call your primary care clinic.  If you do not have a primary care provider, please call [phone] and they will assist you.        Care EveryWhere ID     This is your Care EveryWhere ID. This could be used by other organizations to access your [place] medical records  [id number]        Your Vitals Were     Pulse Temperature Respirations Height Last Period Pulse Oximetry    90 97.5  F (36.4  C) (Oral) 16 5' 4.75\" (1.645 m) 03/02/2018 (Approximate) 96%    Breastfeeding? BMI (Body Mass Index)                No 20.66 kg/m2           Blood Pressure from Last 3 Encounters:   03/09/18 100/60   02/06/17 117/59   10/09/15 101/55    Weight from Last 3 Encounters:   03/09/18 123 lb 3.2 oz (55.9 kg)   02/06/17 123 lb 6.4 oz (56 kg)   10/09/15 120 lb (54.4 kg)                 Where to get your medicines      These medications were sent to [facility] - [address]  [address], [place] [address]     Phone:  [phone]     triamcinolone 0.1 % cream          Primary Care Provider Office Phone # Fax #    Julianne Esparza [phone] [address] [address]        Equal Access to Services     [facility]: Hadii carmen Farrar, wakietda luqadaha, qaybta kaalmada lavon tovar. So [facility] [phone].    ATENCIÓN: Si habla español, tiene a mercado disposición servicios gratuitos de asistencia lingüística. Llame al [phone].    We comply with applicable federal civil rights laws and " [place] laws. We do not discriminate on the basis of race, color, national origin, age, disability, sex, sexual orientation, or gender identity.            Thank you!     Thank you for choosing [facility]  for your care. Our goal is always to provide you with excellent care. Hearing back from our patients is one way we can continue to improve our services. Please take a few minutes to complete the written survey that you may receive in the mail after your visit with us. Thank you!             Your Updated Medication List - Protect others around you: Learn how to safely use, store and throw away your medicines at www.disposemymeds.org.          This list is accurate as of 3/9/18 12:44 PM.  Always use your most recent med list.                   Brand Name Dispense Instructions for use Diagnosis    triamcinolone 0.1 % cream    KENALOG    80 g    Apply sparingly to affected area three times daily as needed    Irritant contact dermatitis due to other chemical products       TYLENOL 325 MG tablet   Generic drug:  acetaminophen      2 TABLETS EVERY 4 HOURS AS NEEDED

## 2018-03-10 ASSESSMENT — PATIENT HEALTH QUESTIONNAIRE - PHQ9: SUM OF ALL RESPONSES TO PHQ QUESTIONS 1-9: 2

## 2018-03-13 DIAGNOSIS — F33.0 MAJOR DEPRESSIVE DISORDER, RECURRENT EPISODE, MILD (H): ICD-10-CM

## 2018-03-13 NOTE — TELEPHONE ENCOUNTER
citalopram (CELEXA) 20 MG tablet (Discontinued) 90 tablet 3 2/6/2017 3/9/2018 No     Sig: Take 1 tablet (20 mg) by mouth daily     Class: E-Prescribe     Route: Oral     Reason for Discontinue: Therapy completed     Order: [id number]     E-Prescribing Status: Receipt confirmed by pharmacy (2/6/2017  6:43 PM CST)           - Per office visit note from 3/9/18, patient is to taper off citalopram- does another refill need to be sent for patient to taper off?       ASSESSMENT/PLAN:   1. Irritant contact dermatitis due to other chemical products  Controlled. She still has itching but continues with triamcinolone, Cetaphil, and Vanicream.  - triamcinolone (KENALOG) 0.1 % cream; Apply sparingly to affected area three times daily as needed  Dispense: 80 g; Refill: 3     2. Routine general medical examination at a health care facility     - Lipid panel reflex to direct LDL Fasting; Future  - **Glucose FUTURE anytime; Future     3. Mild major depression (H)  Stable. Patient will start tapering off of citalopram.    Ernestine Huynh RN

## 2018-03-14 RX ORDER — CITALOPRAM HYDROBROMIDE 20 MG/1
TABLET ORAL
Qty: 90 TABLET | Refills: 2 | OUTPATIENT
Start: 2018-03-14

## 2018-03-20 RX ORDER — CITALOPRAM HYDROBROMIDE 10 MG/1
10 TABLET ORAL DAILY
Qty: 30 TABLET | Refills: 3 | Status: SHIPPED | OUTPATIENT
Start: 2018-03-20 | End: 2018-11-05

## 2018-03-20 NOTE — TELEPHONE ENCOUNTER
Reason for call:  Other   Patient called regarding (reason for call): call back  Additional comments: about message below    Phone number to reach patient:  Cell number on file:    Telephone Information:   Mobile [phone]     Best Time:  soon    Can we leave a detailed message on this number?  YES

## 2018-03-20 NOTE — TELEPHONE ENCOUNTER
Called pt to verify if she is tapering off. Pt said that she is taking 1/2 tablet so she need half of order sent to [facility] [place].  Jazmyne Matos CMA '

## 2018-11-02 NOTE — PROGRESS NOTES
SUBJECTIVE:   Shruthi Bedoya is a 38 year old female who presents to clinic today for the following health issues:      Abnormal Mood Symptoms  Onset: ongoing but not taking medication    Description:   Depression: YES  Anxiety: YES    Accompanying Signs & Symptoms:  Still participating in activities that you used to enjoy: YES  Fatigue: YES  Irritability: YES  Difficulty concentrating: YES  Changes in appetite: YES  Problems with sleep: no   Heart racing/beating fast : YES- sometimes  Thoughts of hurting yourself or others: none    History:   Recent stress: YES- marital stuff  Prior depression hospitalization: None  Family history of depression: no   Family history of anxiety: no     Precipitating factors:   Alcohol/drug use: no     Alleviating factors:  Being more active    Therapies Tried and outcome: Celexa (Citalopram) and just a little    Patient was previously on citalopram for nearly 20 years.  She tried weaning off and feels like she needs to restart something for depression and anxiety.  She denies suicidal ideation.  She has previously tried lexapro, prozac and is unsure why they were changed.    Problem list and histories reviewed & adjusted, as indicated.  Additional history: as documented    Patient Active Problem List   Diagnosis     Contact dermatitis     Eczema     CARDIOVASCULAR SCREENING; LDL GOAL LESS THAN 160     Mild major depression (H)     History of iron deficiency     History reviewed. No pertinent surgical history.    Social History   Substance Use Topics     Smoking status: Former Smoker     Types: Cigarettes     Quit date: 4/6/2016     Smokeless tobacco: Never Used      Comment: 6 cigarettes daily (10/9/15)     Alcohol use No     Family History   Problem Relation Age of Onset     Hypertension Mother      Osteoporosis Mother      Unknown/Adopted Father      HEART DISEASE Maternal Grandfather      Cancer Maternal Grandfather      leukemia     Respiratory Paternal Grandfather       "emphysema     Diabetes No family hx of      Cerebrovascular Disease No family hx of      Thyroid Disease No family hx of      Glaucoma No family hx of      Macular Degeneration No family hx of          Current Outpatient Prescriptions   Medication Sig Dispense Refill     sertraline (ZOLOFT) 50 MG tablet Take 0.5 tablets (25 mg) by mouth daily For 2 weeks, then increase to 1 tab (50 mg) daily 30 tablet 1     triamcinolone (KENALOG) 0.1 % cream Apply sparingly to affected area three times daily as needed 80 g 3     TYLENOL 325 MG OR TABS 2 TABLETS EVERY 4 HOURS AS NEEDED       Allergies   Allergen Reactions     Cats      Dogs      Seasonal Allergies      BP Readings from Last 3 Encounters:   11/05/18 102/60   03/09/18 100/60   02/06/17 117/59    Wt Readings from Last 3 Encounters:   11/05/18 125 lb (56.7 kg)   03/09/18 123 lb 3.2 oz (55.9 kg)   02/06/17 123 lb 6.4 oz (56 kg)                  Labs reviewed in EPIC    Reviewed and updated as needed this visit by clinical staff       Reviewed and updated as needed this visit by Provider         ROS:  Constitutional, HEENT, cardiovascular, pulmonary, gi and gu systems are negative, except as otherwise noted.    OBJECTIVE:     /60  Pulse 86  Temp 98.6  F (37  C) (Oral)  Resp 18  Ht 5' 4.75\" (1.645 m)  Wt 125 lb (56.7 kg)  SpO2 100%  BMI 20.96 kg/m2  Body mass index is 20.96 kg/(m^2).  GENERAL: healthy, alert and no distress  RESP: lungs clear to auscultation - no rales, rhonchi or wheezes  CV: regular rate and rhythm, normal S1 S2, no S3 or S4, no murmur, click or rub, no peripheral edema and peripheral pulses strong  MS: no gross musculoskeletal defects noted, no edema  PSYCH: mentation appears normal, affect normal/bright    Diagnostic Test Results:  none     ASSESSMENT/PLAN:     1. Mild recurrent major depression (H)  Patient to start sertraline.  Consider starting wellbutrin or cymbata if sertraline is not well tolerated.  - sertraline (ZOLOFT) 50 MG " tablet; Take 0.5 tablets (25 mg) by mouth daily For 2 weeks, then increase to 1 tab (50 mg) daily  Dispense: 30 tablet; Refill: 1  - MENTAL HEALTH REFERRAL  - Adult; Outpatient Treatment; Individual/Couples/Family/Group Therapy/[facility]; [facility] [phone]; We will contact you to schedule the appointment or please call with any questions    2. Anxiety  As above.   - sertraline (ZOLOFT) 50 MG tablet; Take 0.5 tablets (25 mg) by mouth daily For 2 weeks, then increase to 1 tab (50 mg) daily  Dispense: 30 tablet; Refill: 1  - MENTAL HEALTH REFERRAL  - Adult; Outpatient Treatment; Individual/Couples/Family/Group Therapy/Health Psychology; [facility] [phone]; We will contact you to schedule the appointment or please call with any questions    3. Need for prophylactic vaccination and inoculation against influenza    - FLU VACCINE, SPLIT VIRUS, IM (QUADRIVALENT) [56882]- >3 YRS  - Vaccine Administration, Initial [92790]    FUTURE APPOINTMENTS:       - Follow-up visit in 1 month.    JOSIAH Ochoa [facility]      Injectable Influenza Immunization Documentation    1.  Is the person to be vaccinated sick today?   No    2. Does the person to be vaccinated have an allergy to a component   of the vaccine?   No  Egg Allergy Algorithm Link    3. Has the person to be vaccinated ever had a serious reaction   to influenza vaccine in the past?   No    4. Has the person to be vaccinated ever had Guillain-Barré syndrome?   No    Form completed by Jazmyne CAGE CMA

## 2018-11-05 ENCOUNTER — OFFICE VISIT (OUTPATIENT)
Dept: FAMILY MEDICINE | Facility: CLINIC | Age: 38
End: 2018-11-05
Payer: COMMERCIAL

## 2018-11-05 VITALS
RESPIRATION RATE: 18 BRPM | DIASTOLIC BLOOD PRESSURE: 60 MMHG | HEART RATE: 86 BPM | HEIGHT: 65 IN | SYSTOLIC BLOOD PRESSURE: 102 MMHG | TEMPERATURE: 98.6 F | WEIGHT: 125 LBS | OXYGEN SATURATION: 100 % | BODY MASS INDEX: 20.83 KG/M2

## 2018-11-05 DIAGNOSIS — F41.9 ANXIETY: ICD-10-CM

## 2018-11-05 DIAGNOSIS — Z23 NEED FOR PROPHYLACTIC VACCINATION AND INOCULATION AGAINST INFLUENZA: ICD-10-CM

## 2018-11-05 DIAGNOSIS — F33.0 MILD RECURRENT MAJOR DEPRESSION (H): Primary | ICD-10-CM

## 2018-11-05 PROCEDURE — 99214 OFFICE O/P EST MOD 30 MIN: CPT | Mod: 25 | Performed by: NURSE PRACTITIONER

## 2018-11-05 PROCEDURE — 90686 IIV4 VACC NO PRSV 0.5 ML IM: CPT | Performed by: NURSE PRACTITIONER

## 2018-11-05 PROCEDURE — 90471 IMMUNIZATION ADMIN: CPT | Performed by: NURSE PRACTITIONER

## 2018-11-05 ASSESSMENT — ANXIETY QUESTIONNAIRES
5. BEING SO RESTLESS THAT IT IS HARD TO SIT STILL: SEVERAL DAYS
IF YOU CHECKED OFF ANY PROBLEMS ON THIS QUESTIONNAIRE, HOW DIFFICULT HAVE THESE PROBLEMS MADE IT FOR YOU TO DO YOUR WORK, TAKE CARE OF THINGS AT HOME, OR GET ALONG WITH OTHER PEOPLE: SOMEWHAT DIFFICULT
3. WORRYING TOO MUCH ABOUT DIFFERENT THINGS: SEVERAL DAYS
2. NOT BEING ABLE TO STOP OR CONTROL WORRYING: SEVERAL DAYS
7. FEELING AFRAID AS IF SOMETHING AWFUL MIGHT HAPPEN: SEVERAL DAYS
GAD7 TOTAL SCORE: 8
1. FEELING NERVOUS, ANXIOUS, OR ON EDGE: SEVERAL DAYS
6. BECOMING EASILY ANNOYED OR IRRITABLE: MORE THAN HALF THE DAYS

## 2018-11-05 ASSESSMENT — PATIENT HEALTH QUESTIONNAIRE - PHQ9
SUM OF ALL RESPONSES TO PHQ QUESTIONS 1-9: 12
5. POOR APPETITE OR OVEREATING: SEVERAL DAYS

## 2018-11-05 ASSESSMENT — PAIN SCALES - GENERAL: PAINLEVEL: NO PAIN (0)

## 2018-11-05 NOTE — MR AVS SNAPSHOT
After Visit Summary   11/5/2018    Shruthi Bedoya    MRN: [id number]           Patient Information     Date Of Birth          1980        Visit Information        Provider Department      11/5/2018 10:40 AM Pauline Monroy APRN [facility]        Today's Diagnoses     Mild recurrent major depression (H)    -  1    Anxiety        Need for prophylactic vaccination and inoculation against influenza          Care Instructions    [facility]    If you have any questions regarding to your visit please contact your care team:     Team Pink:   Clinic Hours Telephone Number   Internal Medicine:  Dr. Julianne Monroy, NP 7am-7pm  Monday - Thursday   7am-5pm  Fridays  [phone]  (Appointment scheduling available 24/7)   Urgent Care - [place] and [facility] - 11am-9pm Monday-Friday Saturday-Sunday- 9am-5pm   [place] - 5pm-9pm Monday-Friday Saturday-Sunday- 9am-5pm  [phone] - [place]  [phone] - [place]       What options do I have for a visit other than an office visit? We offer electronic visits (e-visits) and telephone visits, when medically appropriate.  Please check with your medical insurance to see if these types of visits are covered, as you will be responsible for any charges that are not paid by your insurance.      You can use Sportlyzer (secure electronic communication) to access to your chart, send your primary care provider a message, or make an appointment. Ask a team member how to get started.     For a price quote for your services, please call our Consumer Price Line at [phone] or our Imaging Cost estimation line at [phone] (for imaging tests).  Jazmyne CAGE CMA            Follow-ups after your visit        Additional Services     MENTAL HEALTH REFERRAL  - Adult; Outpatient Treatment; Individual/Couples/Family/Group Therapy/Health Psychology; [facility]: [facility]  [phone]; We will contact you to schedule the appointment or please call with any questions       All scheduling is subject to the client's specific insurance plan & benefits, provider/location availability, and provider clinical specialities.  Please arrive 15 minutes early for your first appointment and bring your completed paperwork.    Please be aware that coverage of these services is subject to the terms and limitations of your health insurance plan.  Call member services at your health plan with any benefit or coverage questions.                            Follow-up notes from your care team     Return in about 4 weeks (around 12/3/2018) for Medication Recheck.      Who to contact     If you have questions or need follow up information about today's clinic visit or your schedule please contact [facility] directly at [phone].  Normal or non-critical lab and imaging results will be communicated to you by MyChart, letter or phone within 4 business days after the clinic has received the results. If you do not hear from us within 7 days, please contact the clinic through Reocarhart or phone. If you have a critical or abnormal lab result, we will notify you by phone as soon as possible.  Submit refill requests through Backtrace I/O or call your pharmacy and they will forward the refill request to us. Please allow 3 business days for your refill to be completed.          Additional Information About Your Visit        Backtrace I/O Information     Backtrace I/O gives you secure access to your electronic health record. If you see a primary care provider, you can also send messages to your care team and make appointments. If you have questions, please call your primary care clinic.  If you do not have a primary care provider, please call [phone] and they will assist you.        Care EveryWhere ID     This is your Care EveryWhere ID. This could be used by other organizations to access your [facility]  "records  [id number]        Your Vitals Were     Pulse Temperature Respirations Height Pulse Oximetry BMI (Body Mass Index)    86 98.6  F (37  C) (Oral) 18 5' 4.75\" (1.645 m) 100% 20.96 kg/m2       Blood Pressure from Last 3 Encounters:   11/05/18 102/60   03/09/18 100/60   02/06/17 117/59    Weight from Last 3 Encounters:   11/05/18 125 lb (56.7 kg)   03/09/18 123 lb 3.2 oz (55.9 kg)   02/06/17 123 lb 6.4 oz (56 kg)              We Performed the Following     FLU VACCINE, SPLIT VIRUS, IM (QUADRIVALENT) [21158]- >3 YRS     MENTAL HEALTH REFERRAL  - Adult; Outpatient Treatment; Individual/Couples/Family/Group Therapy/Health Psychology; G: [facility] [phone]; We will contact you to schedule the appointment or please call with any questions     Vaccine Administration, Initial [12069]          Today's Medication Changes          These changes are accurate as of 11/5/18 11:37 AM.  If you have any questions, ask your nurse or doctor.               Start taking these medicines.        Dose/Directions    sertraline 50 MG tablet   Commonly known as:  ZOLOFT   Used for:  Mild recurrent major depression (H), Anxiety   Started by:  Pauline Monroy APRN CNP        Dose:  25 mg   Take 0.5 tablets (25 mg) by mouth daily For 2 weeks, then increase to 1 tab (50 mg) daily   Quantity:  30 tablet   Refills:  1            Where to get your medicines      These medications were sent to [facility]/pharmacy #[id number] [facility] [place] [facility] [address] [facility] [address]     Phone:  [phone]     sertraline 50 MG tablet                Primary Care Provider Office Phone # Fax #    Julianne Esparza [phone] [address] [facility] [place] [address]        Equal Access to Services     [facility] ISAURO AH: Romulo Farrar, waaxmatthew luhardeep, qayblavon hyman. So [facility] [phone].    ATENCIÓN: Si jude bravo, " tiene a mercado disposición servicios gratuitos de asistencia lingüística. Alen dasilva [phone].    We comply with applicable federal civil rights laws and [place] laws. We do not discriminate on the basis of race, color, national origin, age, disability, sex, sexual orientation, or gender identity.            Thank you!     Thank you for choosing [facility]  for your care. Our goal is always to provide you with excellent care. Hearing back from our patients is one way we can continue to improve our services. Please take a few minutes to complete the written survey that you may receive in the mail after your visit with us. Thank you!             Your Updated Medication List - Protect others around you: Learn how to safely use, store and throw away your medicines at www.disposemymeds.org.          This list is accurate as of 11/5/18 11:37 AM.  Always use your most recent med list.                   Brand Name Dispense Instructions for use Diagnosis    sertraline 50 MG tablet    ZOLOFT    30 tablet    Take 0.5 tablets (25 mg) by mouth daily For 2 weeks, then increase to 1 tab (50 mg) daily    Mild recurrent major depression (H), Anxiety       triamcinolone 0.1 % cream    KENALOG    80 g    Apply sparingly to affected area three times daily as needed    Irritant contact dermatitis due to other chemical products       TYLENOL 325 MG tablet   Generic drug:  acetaminophen      2 TABLETS EVERY 4 HOURS AS NEEDED

## 2018-11-05 NOTE — PATIENT INSTRUCTIONS
[facility]    If you have any questions regarding to your visit please contact your care team:     Team Pink:   Clinic Hours Telephone Number   Internal Medicine:  Dr. Julianne Monroy NP 7am-7pm  Monday - Thursday   7am-5pm  Fridays  [phone]  (Appointment scheduling available 24/7)   Urgent Care - [place] and [facility] - 11am-9pm Monday-Friday Saturday-Sunday- 9am-5pm   [place] - 5pm-9pm Monday-Friday Saturday-Sunday- 9am-5pm  [phone] - [place]  [phone] - [place]       What options do I have for a visit other than an office visit? We offer electronic visits (e-visits) and telephone visits, when medically appropriate.  Please check with your medical insurance to see if these types of visits are covered, as you will be responsible for any charges that are not paid by your insurance.      You can use POSLavu (secure electronic communication) to access to your chart, send your primary care provider a message, or make an appointment. Ask a team member how to get started.     For a price quote for your services, please call our Consumer Price Line at [phone] or our Imaging Cost estimation line at [phone] (for imaging tests).  Jazmyne CAGE CMA

## 2018-11-06 ASSESSMENT — ANXIETY QUESTIONNAIRES: GAD7 TOTAL SCORE: 8

## 2018-11-29 NOTE — PROGRESS NOTES
SUBJECTIVE:   Shruthi Bedoya is a 38 year old female who presents to clinic today for the following health issues:      Depression and Anxiety Follow-Up    Status since last visit: Improved     Other associated symptoms:None    Complicating factors:     Significant life event: No     Current substance abuse: None    PHQ 2/6/2017 3/9/2018 11/5/2018   PHQ-9 Total Score 0 2 12   Q9: Suicide Ideation Not at all Not at all Not at all     ANGELITA-7 SCORE 11/5/2018   Total Score 8     In the past two weeks have you had thoughts of suicide or self-harm?  No.    Do you have concerns about your personal safety or the safety of others?   No  PHQ-9  English  PHQ-9   Any Language  ANGELITA-7  Suicide Assessment Five-step Evaluation and Treatment (SAFE-T)    Amount of exercise or physical activity: None    Problems taking medications regularly: No    Medication side effects: none    Diet: regular (no restrictions)        Patient feels really good on sertraline.  She feels that the 50 mg dose is helpful.  She denies any side effects.  She is working on self care as well with diet and exercise to help mood.  She did not schedule counseling, but plans to consider it again after the holidays.    Problem list and histories reviewed & adjusted, as indicated.  Additional history: as documented    Patient Active Problem List   Diagnosis     Contact dermatitis     Eczema     CARDIOVASCULAR SCREENING; LDL GOAL LESS THAN 160     Mild major depression (H)     History of iron deficiency     Past Surgical History:   Procedure Laterality Date     BIOPSY         Social History   Substance Use Topics     Smoking status: Former Smoker     Types: Cigarettes     Quit date: 4/6/2016     Smokeless tobacco: Never Used      Comment: 6 cigarettes daily (10/9/15)     Alcohol use No     Family History   Problem Relation Age of Onset     Hypertension Mother      Osteoporosis Mother      Unknown/Adopted Father      Heart Disease Maternal Grandfather      Cancer  "Maternal Grandfather      leukemia     Respiratory Paternal Grandfather      emphysema     Diabetes No family hx of      Cerebrovascular Disease No family hx of      Thyroid Disease No family hx of      Glaucoma No family hx of      Macular Degeneration No family hx of          Current Outpatient Prescriptions   Medication Sig Dispense Refill     sertraline (ZOLOFT) 50 MG tablet Take 1 tablet (50 mg) by mouth daily 90 tablet 1     triamcinolone (KENALOG) 0.1 % external cream Apply sparingly to affected area three times daily as needed 80 g 3     TYLENOL 325 MG OR TABS 2 TABLETS EVERY 4 HOURS AS NEEDED       [DISCONTINUED] sertraline (ZOLOFT) 50 MG tablet Take 0.5 tablets (25 mg) by mouth daily For 2 weeks, then increase to 1 tab (50 mg) daily 30 tablet 1     Allergies   Allergen Reactions     Cats      Dogs      Seasonal Allergies      BP Readings from Last 3 Encounters:   12/03/18 98/62   11/05/18 102/60   03/09/18 100/60    Wt Readings from Last 3 Encounters:   12/03/18 122 lb (55.3 kg)   11/05/18 125 lb (56.7 kg)   03/09/18 123 lb 3.2 oz (55.9 kg)                  Labs reviewed in EPIC    Reviewed and updated as needed this visit by clinical staff       Reviewed and updated as needed this visit by Provider         ROS:  Constitutional, HEENT, cardiovascular, pulmonary, gi and gu systems are negative, except as otherwise noted.    OBJECTIVE:     BP 98/62  Pulse 97  Temp 98.9  F (37.2  C) (Oral)  Resp 18  Ht 5' 4.75\" (1.645 m)  Wt 122 lb (55.3 kg)  SpO2 98%  BMI 20.46 kg/m2  Body mass index is 20.46 kg/(m^2).  GENERAL: healthy, alert and no distress  RESP: lungs clear to auscultation - no rales, rhonchi or wheezes  CV: regular rate and rhythm, normal S1 S2, no S3 or S4, no murmur, click or rub, no peripheral edema and peripheral pulses strong  MS: no gross musculoskeletal defects noted, no edema  PSYCH: mentation appears normal, affect normal/bright    Diagnostic Test Results:  none     ASSESSMENT/PLAN: "     1. Mild recurrent major depression (H)  Stable.  Continue current treatment plan and medications.    - sertraline (ZOLOFT) 50 MG tablet; Take 1 tablet (50 mg) by mouth daily  Dispense: 90 tablet; Refill: 1    2. Anxiety  Stable.  Continue current treatment plan and medications.    - sertraline (ZOLOFT) 50 MG tablet; Take 1 tablet (50 mg) by mouth daily  Dispense: 90 tablet; Refill: 1    3. Irritant contact dermatitis due to other chemical products  Refill requested.  - triamcinolone (KENALOG) 0.1 % external cream; Apply sparingly to affected area three times daily as needed  Dispense: 80 g; Refill: 3    FUTURE APPOINTMENTS:       - Follow-up visit in 6 months.    JOSIAH Ochoa [facility]

## 2018-12-03 ENCOUNTER — OFFICE VISIT (OUTPATIENT)
Dept: FAMILY MEDICINE | Facility: CLINIC | Age: 38
End: 2018-12-03
Payer: COMMERCIAL

## 2018-12-03 VITALS
WEIGHT: 122 LBS | HEART RATE: 97 BPM | SYSTOLIC BLOOD PRESSURE: 98 MMHG | BODY MASS INDEX: 20.33 KG/M2 | RESPIRATION RATE: 18 BRPM | DIASTOLIC BLOOD PRESSURE: 62 MMHG | HEIGHT: 65 IN | TEMPERATURE: 98.9 F | OXYGEN SATURATION: 98 %

## 2018-12-03 DIAGNOSIS — L24.5 IRRITANT CONTACT DERMATITIS DUE TO OTHER CHEMICAL PRODUCTS: ICD-10-CM

## 2018-12-03 DIAGNOSIS — F33.0 MILD RECURRENT MAJOR DEPRESSION (H): Primary | ICD-10-CM

## 2018-12-03 DIAGNOSIS — F41.9 ANXIETY: ICD-10-CM

## 2018-12-03 PROCEDURE — 99213 OFFICE O/P EST LOW 20 MIN: CPT | Performed by: NURSE PRACTITIONER

## 2018-12-03 RX ORDER — TRIAMCINOLONE ACETONIDE 1 MG/G
CREAM TOPICAL
Qty: 80 G | Refills: 3 | Status: SHIPPED | OUTPATIENT
Start: 2018-12-03 | End: 2022-07-25

## 2018-12-03 ASSESSMENT — PATIENT HEALTH QUESTIONNAIRE - PHQ9: SUM OF ALL RESPONSES TO PHQ QUESTIONS 1-9: 3

## 2018-12-03 ASSESSMENT — PAIN SCALES - GENERAL: PAINLEVEL: NO PAIN (0)

## 2018-12-03 NOTE — MR AVS SNAPSHOT
After Visit Summary   12/3/2018    Shruthi Bedoya    MRN: [id number]           Patient Information     Date Of Birth          1980        Visit Information        Provider Department      12/3/2018 10:40 AM Pauline Monroy APRN CNP [facility]        Today's Diagnoses     Mild recurrent major depression (H)    -  1    Anxiety        Irritant contact dermatitis due to other chemical products          Care Instructions    [facility]    If you have any questions regarding to your visit please contact your care team:     Team Pink:   Clinic Hours Telephone Number   Internal Medicine:  Dr. Julianne Monroy, NP 7am-7pm  Monday - Thursday   7am-5pm  Fridays  [phone]  (Appointment scheduling available 24/7)   Urgent Care - [place] and [facility] - 11am-9pm Monday-Friday Saturday-Sunday- 9am-5pm   [place] - 5pm-9pm Monday-Friday Saturday-Sunday- 9am-5pm  [phone] - [place]  [phone] - [place]       What options do I have for a visit other than an office visit? We offer electronic visits (e-visits) and telephone visits, when medically appropriate.  Please check with your medical insurance to see if these types of visits are covered, as you will be responsible for any charges that are not paid by your insurance.      You can use CardiaLen (secure electronic communication) to access to your chart, send your primary care provider a message, or make an appointment. Ask a team member how to get started.     For a price quote for your services, please call our Consumer Price Line at [phone] or our Imaging Cost estimation line at [phone] (for imaging tests).            Follow-ups after your visit        Follow-up notes from your care team     Return in about 6 months (around 6/3/2019) for Physical Exam.      Who to contact     If you have questions or need follow up information about today's clinic  "visit or your schedule please contact [facility] directly at [phone].  Normal or non-critical lab and imaging results will be communicated to you by MyChart, letter or phone within 4 business days after the clinic has received the results. If you do not hear from us within 7 days, please contact the clinic through Pixel Velocityt or phone. If you have a critical or abnormal lab result, we will notify you by phone as soon as possible.  Submit refill requests through Vast or call your pharmacy and they will forward the refill request to us. Please allow 3 business days for your refill to be completed.          Additional Information About Your Visit        MapMyFitnessharLife Sciences [employer] Information     Vast gives you secure access to your electronic health record. If you see a primary care provider, you can also send messages to your care team and make appointments. If you have questions, please call your primary care clinic.  If you do not have a primary care provider, please call [phone] and they will assist you.        Care EveryWhere ID     This is your Care EveryWhere ID. This could be used by other organizations to access your [place] medical records  [id number]        Your Vitals Were     Pulse Temperature Respirations Height Pulse Oximetry BMI (Body Mass Index)    97 98.9  F (37.2  C) (Oral) 18 5' 4.75\" (1.645 m) 98% 20.46 kg/m2       Blood Pressure from Last 3 Encounters:   12/03/18 98/62   11/05/18 102/60   03/09/18 100/60    Weight from Last 3 Encounters:   12/03/18 122 lb (55.3 kg)   11/05/18 125 lb (56.7 kg)   03/09/18 123 lb 3.2 oz (55.9 kg)              Today, you had the following     No orders found for display         Today's Medication Changes          These changes are accurate as of 12/3/18 11:12 AM.  If you have any questions, ask your nurse or doctor.               These medicines have changed or have updated prescriptions.        Dose/Directions    sertraline 50 MG tablet   Commonly known as: "  ZOLOFT   This may have changed:    - how much to take  - additional instructions   Used for:  Mild recurrent major depression (H), Anxiety   Changed by:  Pauline Monroy APRN CNP        Dose:  50 mg   Take 1 tablet (50 mg) by mouth daily   Quantity:  90 tablet   Refills:  1            Where to get your medicines      These medications were sent to [facility]/pharmacy #[id number] - [place], [place] - [address]  [address], [place] [address]     Phone:  [phone]     sertraline 50 MG tablet    triamcinolone 0.1 % external cream                Primary Care Provider Office Phone # Fax #    Julianne Esparza [phone] [address] [facility] [address]        Equal Access to Services     [facility] : Romulo mayero Soyvette, waaxda luqadaha, qaybta kaalmada adeegyada, lavon stanton . So [facility] [phone].    ATENCIÓN: Si habla español, tiene a mercado disposición servicios gratuitos de asistencia lingüística. [facility] [phone].    We comply with applicable federal civil rights laws and [place] laws. We do not discriminate on the basis of race, color, national origin, age, disability, sex, sexual orientation, or gender identity.            Thank you!     Thank you for choosing [facility]  for your care. Our goal is always to provide you with excellent care. Hearing back from our patients is one way we can continue to improve our services. Please take a few minutes to complete the written survey that you may receive in the mail after your visit with us. Thank you!             Your Updated Medication List - Protect others around you: Learn how to safely use, store and throw away your medicines at www.disposemymeds.org.          This list is accurate as of 12/3/18 11:12 AM.  Always use your most recent med list.                   Brand Name Dispense Instructions for use Diagnosis    sertraline 50 MG tablet    ZOLOFT    90 tablet     Take 1 tablet (50 mg) by mouth daily    Mild recurrent major depression (H), Anxiety       triamcinolone 0.1 % external cream    KENALOG    80 g    Apply sparingly to affected area three times daily as needed    Irritant contact dermatitis due to other chemical products       TYLENOL 325 MG tablet   Generic drug:  acetaminophen      2 TABLETS EVERY 4 HOURS AS NEEDED

## 2018-12-03 NOTE — PATIENT INSTRUCTIONS
[facility]    If you have any questions regarding to your visit please contact your care team:     Team Pink:   Clinic Hours Telephone Number   Internal Medicine:  Dr. Julianne Monroy NP 7am-7pm  Monday - Thursday   7am-5pm  Fridays  [phone]  (Appointment scheduling available 24/7)   Urgent Care - [place] and [facility] - 11am-9pm Monday-Friday Saturday-Sunday- 9am-5pm   [place] - 5pm-9pm Monday-Friday Saturday-Sunday- 9am-5pm  [phone] - [place]  [phone] - [place]       What options do I have for a visit other than an office visit? We offer electronic visits (e-visits) and telephone visits, when medically appropriate.  Please check with your medical insurance to see if these types of visits are covered, as you will be responsible for any charges that are not paid by your insurance.      You can use Roomixer (secure electronic communication) to access to your chart, send your primary care provider a message, or make an appointment. Ask a team member how to get started.     For a price quote for your services, please call our Consumer Price Line at [phone] or our Imaging Cost estimation line at [phone] (for imaging tests).

## 2019-04-18 ENCOUNTER — DOCUMENTATION ONLY (OUTPATIENT)
Dept: OPHTHALMOLOGY | Facility: CLINIC | Age: 39
End: 2019-04-18

## 2019-04-19 ENCOUNTER — TELEPHONE (OUTPATIENT)
Dept: FAMILY MEDICINE | Facility: CLINIC | Age: 39
End: 2019-04-19

## 2019-04-19 NOTE — TELEPHONE ENCOUNTER
Dandre Hawkins, I m Jazmyne Matos. I work with Pauline Monroy CNP.   at [facility]. He/she noticed in your chart that you are due for a patient health questionnaire. We would like to complete that today as Pauline Monroy CNP. cares about your health.      Sent [facility] Message with PHQ9 questionnaire.  Called patient; Called patient, completed PHQ9. PHQ9 score: . (If patient has sucidal thoughts discuss with Team RN ASAP) <5 PASS, >5 FAIL Needs follow up appointment.  If patient refuses appointment please ask them if they would be willing to speak to the Team RN for further support over the phone.     If PHQ-9 is less than 5, close encounter. If PHQ-9 is 5 or greater, recommend that patient schedule follow up appointment with primary provider (in office, e-visit or phone visit) for medication follow up and evaluation. If positive suicidal thoughts, huddle with RN or provider today and route encounter.     Appointment Made? No    Jazmyne Matos

## 2019-04-28 ENCOUNTER — DOCUMENTATION ONLY (OUTPATIENT)
Dept: FAMILY MEDICINE | Facility: CLINIC | Age: 39
End: 2019-04-28

## 2019-04-28 NOTE — PROGRESS NOTES
This patient has overdue labs. A letter was sent on 3/20/2019 and there has been no lab appointment made. If you still want these labs done, please have your care team contact the patient to make a lab appointment. Otherwise, please have the labs discontinued and close the encounter.    Thank you,  [facility]

## 2019-06-02 DIAGNOSIS — F41.9 ANXIETY: ICD-10-CM

## 2019-06-02 DIAGNOSIS — F33.0 MILD RECURRENT MAJOR DEPRESSION (H): ICD-10-CM

## 2019-06-04 NOTE — TELEPHONE ENCOUNTER
Contacted patient informed her she needed to complete a PHQ-9 patient states she is currently in the car right now but she will call the clinic back once she has time and then she will complete PHQ-9.   Thank you   LS

## 2019-06-13 ASSESSMENT — PATIENT HEALTH QUESTIONNAIRE - PHQ9: SUM OF ALL RESPONSES TO PHQ QUESTIONS 1-9: 1

## 2019-06-13 NOTE — TELEPHONE ENCOUNTER
PHQ-9 completed.   PHQ-9 score:    PHQ-9 SCORE 6/13/2019   PHQ-9 Total Score -   PHQ-9 Total Score 1

## 2019-08-05 ENCOUNTER — TELEPHONE (OUTPATIENT)
Dept: FAMILY MEDICINE | Facility: CLINIC | Age: 39
End: 2019-08-05

## 2019-08-05 DIAGNOSIS — F33.0 MILD RECURRENT MAJOR DEPRESSION (H): ICD-10-CM

## 2019-08-05 DIAGNOSIS — F41.9 ANXIETY: ICD-10-CM

## 2019-08-05 NOTE — TELEPHONE ENCOUNTER
Reason for Call:  Other prescription    Detailed comments: Patient has a question about sertraline before she refills the prescription. Please call back to advise     Phone Number Patient can be reached at: Cell number on file:    Telephone Information:   Mobile [phone]       Best Time: any     Can we leave a detailed message on this number? YES    Call taken on 8/5/2019 at 10:47 AM by Fabiana Stringer

## 2019-08-05 NOTE — TELEPHONE ENCOUNTER
Spoke with patient and she stated that she was trying to get a refill on the Sertraline but the pharmacy told her she needed to contact provider's office first  [place] refill was given last time due to patient overdue for physical  Scheduled patient for a physical with Pauline Monroy NP on 8/16/19  Refill provided as patient will out of med before then    PHQ-9 SCORE 11/5/2018 12/3/2018 6/13/2019   PHQ-9 Total Score - - -   PHQ-9 Total Score 12 3 1       Juan Francisco Mckenna RN

## 2019-08-16 ENCOUNTER — OFFICE VISIT (OUTPATIENT)
Dept: FAMILY MEDICINE | Facility: CLINIC | Age: 39
End: 2019-08-16
Payer: COMMERCIAL

## 2019-08-16 VITALS
RESPIRATION RATE: 18 BRPM | DIASTOLIC BLOOD PRESSURE: 56 MMHG | HEART RATE: 89 BPM | HEIGHT: 65 IN | BODY MASS INDEX: 19.74 KG/M2 | OXYGEN SATURATION: 98 % | WEIGHT: 118.5 LBS | SYSTOLIC BLOOD PRESSURE: 98 MMHG | TEMPERATURE: 97.4 F

## 2019-08-16 DIAGNOSIS — M77.11 RIGHT TENNIS ELBOW: ICD-10-CM

## 2019-08-16 DIAGNOSIS — Z00.00 ROUTINE HISTORY AND PHYSICAL EXAMINATION OF ADULT: Primary | ICD-10-CM

## 2019-08-16 DIAGNOSIS — R35.0 URINARY FREQUENCY: ICD-10-CM

## 2019-08-16 DIAGNOSIS — Z12.31 ENCOUNTER FOR SCREENING MAMMOGRAM FOR BREAST CANCER: ICD-10-CM

## 2019-08-16 DIAGNOSIS — F33.0 MILD RECURRENT MAJOR DEPRESSION (H): ICD-10-CM

## 2019-08-16 DIAGNOSIS — L65.9 HAIR LOSS: ICD-10-CM

## 2019-08-16 DIAGNOSIS — F41.9 ANXIETY: ICD-10-CM

## 2019-08-16 DIAGNOSIS — Z86.39 HISTORY OF IRON DEFICIENCY: ICD-10-CM

## 2019-08-16 LAB
ALBUMIN UR-MCNC: NEGATIVE MG/DL
APPEARANCE UR: NORMAL
BILIRUB UR QL STRIP: NEGATIVE
COLOR UR AUTO: YELLOW
GLUCOSE UR STRIP-MCNC: NEGATIVE MG/DL
HGB UR QL STRIP: NEGATIVE
KETONES UR STRIP-MCNC: NEGATIVE MG/DL
LEUKOCYTE ESTERASE UR QL STRIP: NEGATIVE
NITRATE UR QL: NEGATIVE
NON-SQ EPI CELLS #/AREA URNS LPF: ABNORMAL /LPF
PH UR STRIP: 7 PH (ref 5–7)
RBC #/AREA URNS AUTO: ABNORMAL /HPF
SOURCE: NORMAL
SP GR UR STRIP: <=1.005 (ref 1–1.03)
UROBILINOGEN UR STRIP-ACNC: 0.2 EU/DL (ref 0.2–1)
WBC #/AREA URNS AUTO: ABNORMAL /HPF

## 2019-08-16 PROCEDURE — 81001 URINALYSIS AUTO W/SCOPE: CPT | Performed by: NURSE PRACTITIONER

## 2019-08-16 PROCEDURE — 99396 PREV VISIT EST AGE 40-64: CPT | Performed by: NURSE PRACTITIONER

## 2019-08-16 ASSESSMENT — ENCOUNTER SYMPTOMS
HEARTBURN: 0
HEADACHES: 0
NAUSEA: 0
JOINT SWELLING: 0
FEVER: 0
SORE THROAT: 0
ARTHRALGIAS: 1
CHILLS: 0
WEAKNESS: 0
SHORTNESS OF BREATH: 0
NERVOUS/ANXIOUS: 0
DIZZINESS: 0
DYSURIA: 0
ABDOMINAL PAIN: 0
FREQUENCY: 1
PARESTHESIAS: 0
CONSTIPATION: 0
BREAST MASS: 0
DIARRHEA: 0
EYE PAIN: 0
MYALGIAS: 1
COUGH: 0
PALPITATIONS: 0
HEMATOCHEZIA: 0
HEMATURIA: 0

## 2019-08-16 ASSESSMENT — PAIN SCALES - GENERAL: PAINLEVEL: NO PAIN (0)

## 2019-08-16 ASSESSMENT — MIFFLIN-ST. JEOR: SCORE: 1199.42

## 2019-08-16 NOTE — PROGRESS NOTES
SUBJECTIVE:   CC: Shruthi Bedoya is an 39 year old woman who presents for preventive health visit.     Healthy Habits:     Getting at least 3 servings of Calcium per day:  Yes    Bi-annual eye exam:  Yes    Dental care twice a year:  NO    Sleep apnea or symptoms of sleep apnea:  Daytime drowsiness    Diet:  Regular (no restrictions)    Frequency of exercise:  None    Taking medications regularly:  Yes    Medication side effects:  None    PHQ-2 Total Score: 0    Additional concerns today:  Yes    Patient notes right elbow pain.  She works as a hairdresser.  She denies paresthesia, weakness.    Patient notes frequent urination.  She sometimes feels like she doesn't completely empty her bladder.  She denies dysuria, constipation.    Patient notes hair loss.  She feels her hair is thinning    Depression and Anxiety Follow-Up    How are you doing with your depression since your last visit? No change    How are you doing with your anxiety since your last visit?  No change    Are you having other symptoms that might be associated with depression or anxiety? No    Have you had a significant life event? No     Do you have any concerns with your use of alcohol or other drugs? No    Social History     Tobacco Use     Smoking status: Former Smoker     Types: Cigarettes     Last attempt to quit: 4/6/2016     Years since quitting: 3.3     Smokeless tobacco: Never Used     Tobacco comment: 6 cigarettes daily (10/9/15)   Substance Use Topics     Alcohol use: No     Drug use: No     PHQ 11/5/2018 12/3/2018 6/13/2019   PHQ-9 Total Score 12 3 1   Q9: Thoughts of better off dead/self-harm past 2 weeks Not at all Not at all Not at all     ANGELITA-7 SCORE 11/5/2018   Total Score 8           Suicide Assessment Five-step Evaluation and Treatment (SAFE-T)    Today's PHQ-2 Score:   PHQ-2 ( 1999 Pfizer) 8/16/2019   Q1: Little interest or pleasure in doing things 0   Q2: Feeling down, depressed or hopeless 0   PHQ-2 Score 0   Q1: Little  interest or pleasure in doing things Not at all   Q2: Feeling down, depressed or hopeless Not at all   PHQ-2 Score 0       Abuse: Current or Past(Physical, Sexual or Emotional)- No  Do you feel safe in your environment? Yes    Social History     Tobacco Use     Smoking status: Former Smoker     Types: Cigarettes     Last attempt to quit: 4/6/2016     Years since quitting: 3.3     Smokeless tobacco: Never Used     Tobacco comment: 6 cigarettes daily (10/9/15)   Substance Use Topics     Alcohol use: No     If you drink alcohol do you typically have >3 drinks per day or >7 drinks per week? No    Alcohol Use 8/16/2019   Prescreen: >3 drinks/day or >7 drinks/week? No   Prescreen: >3 drinks/day or >7 drinks/week? -   No flowsheet data found.    Reviewed orders with patient.  Reviewed health maintenance and updated orders accordingly - Yes  Lab work is in process  BP Readings from Last 3 Encounters:   08/16/19 98/56   12/03/18 98/62   11/05/18 102/60    Wt Readings from Last 3 Encounters:   08/16/19 53.8 kg (118 lb 8 oz)   12/03/18 55.3 kg (122 lb)   11/05/18 56.7 kg (125 lb)                  Patient Active Problem List   Diagnosis     Contact dermatitis     Eczema     CARDIOVASCULAR SCREENING; LDL GOAL LESS THAN 160     Mild major depression (H)     History of iron deficiency     Past Surgical History:   Procedure Laterality Date     BIOPSY         Social History     Tobacco Use     Smoking status: Former Smoker     Types: Cigarettes     Last attempt to quit: 4/6/2016     Years since quitting: 3.3     Smokeless tobacco: Never Used     Tobacco comment: 6 cigarettes daily (10/9/15)   Substance Use Topics     Alcohol use: No     Family History   Problem Relation Age of Onset     Hypertension Mother      Osteoporosis Mother      Unknown/Adopted Father      Heart Disease Maternal Grandfather      Cancer Maternal Grandfather         leukemia     Respiratory Paternal Grandfather         emphysema     Diabetes No family hx of       Cerebrovascular Disease No family hx of      Thyroid Disease No family hx of      Glaucoma No family hx of      Macular Degeneration No family hx of          Current Outpatient Medications   Medication Sig Dispense Refill     sertraline (ZOLOFT) 50 MG tablet Take 1 tablet (50 mg) by mouth daily 90 tablet 1     triamcinolone (KENALOG) 0.1 % external cream Apply sparingly to affected area three times daily as needed 80 g 3     TYLENOL 325 MG OR TABS 2 TABLETS EVERY 4 HOURS AS NEEDED       Allergies   Allergen Reactions     Cats      Dogs      Seasonal Allergies        Mammogram Screening: Patient under age 50, mutual decision reflected in health maintenance.      Pertinent mammograms are reviewed under the imaging tab.  History of abnormal Pap smear: NO - age 30-65 PAP every 5 years with negative HPV co-testing recommended  PAP / HPV Latest Ref Rng & Units 2/6/2017 4/21/2014 5/2/2011   PAP - NIL NIL NIL   HPV 16 DNA NEG Negative - -   HPV 18 DNA NEG Negative - -   OTHER HR HPV NEG Negative - -     Reviewed and updated as needed this visit by clinical staff  Tobacco  Allergies  Meds  Problems  Med Hx  Surg Hx  Fam Hx  Soc Hx          Reviewed and updated as needed this visit by Provider  Tobacco  Allergies  Meds  Problems  Med Hx  Surg Hx  Fam Hx            Review of Systems   Constitutional: Negative for chills and fever.   HENT: Negative for congestion, ear pain, hearing loss and sore throat.    Eyes: Negative for pain and visual disturbance.   Respiratory: Negative for cough and shortness of breath.    Cardiovascular: Negative for chest pain, palpitations and peripheral edema.   Gastrointestinal: Negative for abdominal pain, constipation, diarrhea, heartburn, hematochezia and nausea.   Breasts:  Negative for tenderness, breast mass and discharge.   Genitourinary: Positive for frequency. Negative for dysuria, genital sores, hematuria, pelvic pain, urgency, vaginal bleeding and vaginal discharge.  "  Musculoskeletal: Positive for arthralgias and myalgias. Negative for joint swelling.   Skin: Negative for rash.   Neurological: Negative for dizziness, weakness, headaches and paresthesias.   Psychiatric/Behavioral: Negative for mood changes. The patient is not nervous/anxious.           OBJECTIVE:   BP 98/56   Pulse 89   Temp 97.4  F (36.3  C) (Oral)   Resp 18   Ht 1.645 m (5' 4.75\")   Wt 53.8 kg (118 lb 8 oz)   LMP 08/09/2019   SpO2 98%   BMI 19.87 kg/m    Physical Exam  GENERAL: healthy, alert and no distress  EYES: Eyes grossly normal to inspection, PERRL and conjunctivae and sclerae normal  HENT: ear canals and TM's normal, nose and mouth without ulcers or lesions  NECK: no adenopathy, no asymmetry, masses, or scars and thyroid normal to palpation  RESP: lungs clear to auscultation - no rales, rhonchi or wheezes  BREAST: normal without masses, tenderness or nipple discharge and no palpable axillary masses or adenopathy  CV: regular rate and rhythm, normal S1 S2, no S3 or S4, no murmur, click or rub, no peripheral edema and peripheral pulses strong  ABDOMEN: soft, nontender, no hepatosplenomegaly, no masses and bowel sounds normal  MS: Tenderness to palpation of right lateral epicondyl; pain with range of motion of right elbow  PSYCH: mentation appears normal, affect normal/bright    Diagnostic Test Results:  In process    ASSESSMENT/PLAN:   1. Routine history and physical examination of adult    - Lipid panel reflex to direct LDL Fasting; Future  - **Glucose FUTURE anytime; Future    2. Mild recurrent major depression (H)  Stable.  Continue current treatment plan and medications.   - sertraline (ZOLOFT) 50 MG tablet; Take 1 tablet (50 mg) by mouth daily  Dispense: 90 tablet; Refill: 1    3. Anxiety  Stable.  Continue current treatment plan and medications.   - sertraline (ZOLOFT) 50 MG tablet; Take 1 tablet (50 mg) by mouth daily  Dispense: 90 tablet; Refill: 1    4. Hair loss    - **TSH with free T4 " "reflex FUTURE anytime; Future    5. History of iron deficiency    - Ferritin; Future  - Iron and iron binding capacity; Future  - **Hemoglobin FUTURE anytime; Future    6. Right tennis elbow  Patient to wear over the counter brace during the day.  If no improvement, consider physical therapy, orthopedic follow-up.    7. Urinary frequency  Consider pelvic floor physical therapy, trial of oxybutynin pending results.  - UA reflex to Microscopic and Culture    8. Encounter for screening mammogram for breast cancer    - *MA Screening Digital Bilateral; Future    COUNSELING:  Reviewed preventive health counseling, as reflected in patient instructions       Regular exercise       Healthy diet/nutrition    Estimated body mass index is 19.87 kg/m  as calculated from the following:    Height as of this encounter: 1.645 m (5' 4.75\").    Weight as of this encounter: 53.8 kg (118 lb 8 oz).         reports that she quit smoking about 3 years ago. Her smoking use included cigarettes. She has never used smokeless tobacco.      Counseling Resources:  ATP IV Guidelines  Pooled Cohorts Equation Calculator  Breast Cancer Risk Calculator  FRAX Risk Assessment  ICSI Preventive Guidelines  Dietary Guidelines for Americans, 2010  USDA's MyPlate  ASA Prophylaxis  Lung CA Screening    JOSIAH Ochoa CNP  [facility]  "

## 2019-08-16 NOTE — RESULT ENCOUNTER NOTE
Please call patient-    Her urine was normal.  She can try pelvic floor physical therapy for urinary frequency or a trial of ditropan xL 5 mg daily (#30, 1 RF, indication urinary frequency).    Thanks,  Pauline Monroy, CNP

## 2019-08-19 DIAGNOSIS — Z86.39 HISTORY OF IRON DEFICIENCY: ICD-10-CM

## 2019-08-19 DIAGNOSIS — Z00.00 ROUTINE HISTORY AND PHYSICAL EXAMINATION OF ADULT: ICD-10-CM

## 2019-08-19 DIAGNOSIS — L65.9 HAIR LOSS: ICD-10-CM

## 2019-08-19 LAB
CHOLEST SERPL-MCNC: 161 MG/DL
FERRITIN SERPL-MCNC: 43 NG/ML (ref 12–150)
GLUCOSE SERPL-MCNC: 86 MG/DL (ref 70–99)
HDLC SERPL-MCNC: 47 MG/DL
HGB BLD-MCNC: 11.6 G/DL (ref 11.7–15.7)
IRON SATN MFR SERPL: 34 % (ref 15–46)
IRON SERPL-MCNC: 88 UG/DL (ref 35–180)
LDLC SERPL CALC-MCNC: 102 MG/DL
NONHDLC SERPL-MCNC: 114 MG/DL
TIBC SERPL-MCNC: 258 UG/DL (ref 240–430)
TRIGL SERPL-MCNC: 61 MG/DL
TSH SERPL DL<=0.005 MIU/L-ACNC: 1.82 MU/L (ref 0.4–4)

## 2019-08-19 PROCEDURE — 36415 COLL VENOUS BLD VENIPUNCTURE: CPT | Performed by: NURSE PRACTITIONER

## 2019-08-19 PROCEDURE — 82728 ASSAY OF FERRITIN: CPT | Performed by: NURSE PRACTITIONER

## 2019-08-19 PROCEDURE — 82947 ASSAY GLUCOSE BLOOD QUANT: CPT | Performed by: NURSE PRACTITIONER

## 2019-08-19 PROCEDURE — 83540 ASSAY OF IRON: CPT | Performed by: NURSE PRACTITIONER

## 2019-08-19 PROCEDURE — 84443 ASSAY THYROID STIM HORMONE: CPT | Performed by: NURSE PRACTITIONER

## 2019-08-19 PROCEDURE — 83550 IRON BINDING TEST: CPT | Performed by: NURSE PRACTITIONER

## 2019-08-19 PROCEDURE — 85018 HEMOGLOBIN: CPT | Performed by: NURSE PRACTITIONER

## 2019-08-19 PROCEDURE — 80061 LIPID PANEL: CPT | Performed by: NURSE PRACTITIONER

## 2019-08-19 NOTE — RESULT ENCOUNTER NOTE
Dear Shruthi,    Your recent test results are attached.      Normal glucose.  Okay cholesterol.  Normal iron and iron stores.  Normal thyroid.    If you have any questions please feel free to contact [phone] or myself via [employer].    Sincerely,  Pauline Monroy, CNP

## 2019-11-08 ENCOUNTER — HEALTH MAINTENANCE LETTER (OUTPATIENT)
Age: 39
End: 2019-11-08

## 2020-03-18 DIAGNOSIS — F33.0 MILD RECURRENT MAJOR DEPRESSION (H): ICD-10-CM

## 2020-03-18 DIAGNOSIS — F41.9 ANXIETY: ICD-10-CM

## 2020-03-19 ASSESSMENT — PATIENT HEALTH QUESTIONNAIRE - PHQ9: SUM OF ALL RESPONSES TO PHQ QUESTIONS 1-9: 3

## 2020-06-20 DIAGNOSIS — F33.0 MILD RECURRENT MAJOR DEPRESSION (H): ICD-10-CM

## 2020-06-20 DIAGNOSIS — F41.9 ANXIETY: ICD-10-CM

## 2020-11-12 DIAGNOSIS — F33.0 MILD RECURRENT MAJOR DEPRESSION (H): ICD-10-CM

## 2020-11-12 DIAGNOSIS — F41.9 ANXIETY: ICD-10-CM

## 2020-11-16 NOTE — TELEPHONE ENCOUNTER
"This request is three days old.  Florinda MANUELN, RN    Requested Prescriptions   Pending Prescriptions Disp Refills    sertraline (ZOLOFT) 50 MG tablet 90 tablet 0     Sig: Take 1 tablet (50 mg) by mouth daily       SSRIs Protocol Failed - 11/12/2020 12:45 PM        Failed - PHQ-9 score less than 5 in past 6 months     Please review last PHQ-9 score.           Failed - Recent (6 mo) or future (30 days) visit within the authorizing provider's specialty     Patient had office visit in the last 6 months or has a visit in the next 30 days with authorizing provider or within the authorizing provider's specialty.  See \"Patient Info\" tab in inbasket, or \"Choose Columns\" in Meds & Orders section of the refill encounter.            Passed - Medication is active on med list        Passed - Patient is age 18 or older        Passed - No active pregnancy on record        Passed - No positive pregnancy test in last 12 months             "

## 2020-12-06 ENCOUNTER — HEALTH MAINTENANCE LETTER (OUTPATIENT)
Age: 40
End: 2020-12-06

## 2021-02-23 ENCOUNTER — OFFICE VISIT (OUTPATIENT)
Dept: FAMILY MEDICINE | Facility: CLINIC | Age: 41
End: 2021-02-23
Payer: COMMERCIAL

## 2021-02-23 VITALS
DIASTOLIC BLOOD PRESSURE: 71 MMHG | BODY MASS INDEX: 20.08 KG/M2 | OXYGEN SATURATION: 100 % | HEART RATE: 86 BPM | WEIGHT: 117.6 LBS | SYSTOLIC BLOOD PRESSURE: 113 MMHG | HEIGHT: 64 IN | TEMPERATURE: 98.9 F

## 2021-02-23 DIAGNOSIS — Z86.39 HISTORY OF IRON DEFICIENCY: ICD-10-CM

## 2021-02-23 DIAGNOSIS — F41.9 ANXIETY: ICD-10-CM

## 2021-02-23 DIAGNOSIS — F33.0 MILD RECURRENT MAJOR DEPRESSION (H): ICD-10-CM

## 2021-02-23 DIAGNOSIS — R53.83 FATIGUE, UNSPECIFIED TYPE: ICD-10-CM

## 2021-02-23 DIAGNOSIS — Z12.31 ENCOUNTER FOR SCREENING MAMMOGRAM FOR BREAST CANCER: ICD-10-CM

## 2021-02-23 DIAGNOSIS — Z00.00 ROUTINE HISTORY AND PHYSICAL EXAMINATION OF ADULT: Primary | ICD-10-CM

## 2021-02-23 DIAGNOSIS — H65.23 BILATERAL CHRONIC SEROUS OTITIS MEDIA: ICD-10-CM

## 2021-02-23 DIAGNOSIS — R00.2 PALPITATIONS: ICD-10-CM

## 2021-02-23 DIAGNOSIS — Z23 NEED FOR PROPHYLACTIC VACCINATION AND INOCULATION AGAINST INFLUENZA: ICD-10-CM

## 2021-02-23 LAB
ALBUMIN SERPL-MCNC: 4.3 G/DL (ref 3.4–5)
ALP SERPL-CCNC: 38 U/L (ref 40–150)
ALT SERPL W P-5'-P-CCNC: 22 U/L (ref 0–50)
ANION GAP SERPL CALCULATED.3IONS-SCNC: 5 MMOL/L (ref 3–14)
AST SERPL W P-5'-P-CCNC: 12 U/L (ref 0–45)
BASOPHILS # BLD AUTO: 0 10E9/L (ref 0–0.2)
BASOPHILS NFR BLD AUTO: 0.3 %
BILIRUB SERPL-MCNC: 0.9 MG/DL (ref 0.2–1.3)
BUN SERPL-MCNC: 11 MG/DL (ref 7–30)
CALCIUM SERPL-MCNC: 8.8 MG/DL (ref 8.5–10.1)
CHLORIDE SERPL-SCNC: 104 MMOL/L (ref 94–109)
CO2 SERPL-SCNC: 28 MMOL/L (ref 20–32)
CREAT SERPL-MCNC: 0.76 MG/DL (ref 0.52–1.04)
DIFFERENTIAL METHOD BLD: ABNORMAL
EOSINOPHIL # BLD AUTO: 0.1 10E9/L (ref 0–0.7)
EOSINOPHIL NFR BLD AUTO: 1.3 %
ERYTHROCYTE [DISTWIDTH] IN BLOOD BY AUTOMATED COUNT: 12.8 % (ref 10–15)
FERRITIN SERPL-MCNC: 42 NG/ML (ref 12–150)
GFR SERPL CREATININE-BSD FRML MDRD: >90 ML/MIN/{1.73_M2}
GLUCOSE SERPL-MCNC: 92 MG/DL (ref 70–99)
HCT VFR BLD AUTO: 34.4 % (ref 35–47)
HGB BLD-MCNC: 12.2 G/DL (ref 11.7–15.7)
IRON SATN MFR SERPL: 25 % (ref 15–46)
IRON SERPL-MCNC: 72 UG/DL (ref 35–180)
LYMPHOCYTES # BLD AUTO: 2.7 10E9/L (ref 0.8–5.3)
LYMPHOCYTES NFR BLD AUTO: 35.6 %
MCH RBC QN AUTO: 28.6 PG (ref 26.5–33)
MCHC RBC AUTO-ENTMCNC: 35.5 G/DL (ref 31.5–36.5)
MCV RBC AUTO: 81 FL (ref 78–100)
MONOCYTES # BLD AUTO: 0.8 10E9/L (ref 0–1.3)
MONOCYTES NFR BLD AUTO: 9.8 %
NEUTROPHILS # BLD AUTO: 4 10E9/L (ref 1.6–8.3)
NEUTROPHILS NFR BLD AUTO: 53 %
PLATELET # BLD AUTO: 322 10E9/L (ref 150–450)
POTASSIUM SERPL-SCNC: 3.8 MMOL/L (ref 3.4–5.3)
PROT SERPL-MCNC: 7.5 G/DL (ref 6.8–8.8)
RBC # BLD AUTO: 4.27 10E12/L (ref 3.8–5.2)
SODIUM SERPL-SCNC: 137 MMOL/L (ref 133–144)
TIBC SERPL-MCNC: 291 UG/DL (ref 240–430)
TSH SERPL DL<=0.005 MIU/L-ACNC: 2.49 MU/L (ref 0.4–4)
WBC # BLD AUTO: 7.6 10E9/L (ref 4–11)

## 2021-02-23 PROCEDURE — 82728 ASSAY OF FERRITIN: CPT | Performed by: NURSE PRACTITIONER

## 2021-02-23 PROCEDURE — 90471 IMMUNIZATION ADMIN: CPT | Performed by: NURSE PRACTITIONER

## 2021-02-23 PROCEDURE — 90686 IIV4 VACC NO PRSV 0.5 ML IM: CPT | Performed by: NURSE PRACTITIONER

## 2021-02-23 PROCEDURE — 99213 OFFICE O/P EST LOW 20 MIN: CPT | Mod: 25 | Performed by: NURSE PRACTITIONER

## 2021-02-23 PROCEDURE — 36415 COLL VENOUS BLD VENIPUNCTURE: CPT | Performed by: NURSE PRACTITIONER

## 2021-02-23 PROCEDURE — 83550 IRON BINDING TEST: CPT | Performed by: NURSE PRACTITIONER

## 2021-02-23 PROCEDURE — 80050 GENERAL HEALTH PANEL: CPT | Performed by: NURSE PRACTITIONER

## 2021-02-23 PROCEDURE — 83540 ASSAY OF IRON: CPT | Performed by: NURSE PRACTITIONER

## 2021-02-23 PROCEDURE — 99396 PREV VISIT EST AGE 40-64: CPT | Mod: 25 | Performed by: NURSE PRACTITIONER

## 2021-02-23 ASSESSMENT — ENCOUNTER SYMPTOMS
COUGH: 0
NERVOUS/ANXIOUS: 1
CHILLS: 0
BREAST MASS: 0
DIARRHEA: 0
JOINT SWELLING: 0
DIZZINESS: 0
ABDOMINAL PAIN: 0
NAUSEA: 0
FREQUENCY: 0
FEVER: 0
PARESTHESIAS: 0
HEADACHES: 1
DYSURIA: 0
PALPITATIONS: 0
CONSTIPATION: 0
SHORTNESS OF BREATH: 0
HEMATURIA: 0
ARTHRALGIAS: 0
MYALGIAS: 1
HEARTBURN: 0
EYE PAIN: 0
WEAKNESS: 0
HEMATOCHEZIA: 0
SORE THROAT: 0

## 2021-02-23 ASSESSMENT — PATIENT HEALTH QUESTIONNAIRE - PHQ9: SUM OF ALL RESPONSES TO PHQ QUESTIONS 1-9: 3

## 2021-02-23 ASSESSMENT — MIFFLIN-ST. JEOR: SCORE: 1179.92

## 2021-02-23 ASSESSMENT — PAIN SCALES - GENERAL: PAINLEVEL: NO PAIN (0)

## 2021-02-23 NOTE — PROGRESS NOTES
"   SUBJECTIVE:   CC: Shruthi Bedoya is an 40 year old woman who presents for preventive health visit.       Patient has been advised of split billing requirements and indicates understanding: Yes  Healthy Habits:     Getting at least 3 servings of Calcium per day:  Yes    Bi-annual eye exam:  Yes    Dental care twice a year:  NO    Sleep apnea or symptoms of sleep apnea:  Daytime drowsiness    Diet:  Regular (no restrictions)    Frequency of exercise:  None    Taking medications regularly:  Yes    Medication side effects:  Not applicable    PHQ-2 Total Score: 0    Additional concerns today:  Yes    Patient feels her hair is thinning and feels sluggish.  She notes her anterior neck \"feels\" off.  She also notes plugging to her ears intermittently and has tried over the counter sinus meds, allergy meds intermittently without improvement.    Depression and Anxiety Follow-Up    How are you doing with your depression since your last visit? Stable.    How are you doing with your anxiety since your last visit?  Worsened - increased, noticing palpitations at times with anxiety.    Are you having other symptoms that might be associated with depression or anxiety? No    Have you had a significant life event? OTHER: Pandemic, health concerns     Do you have any concerns with your use of alcohol or other drugs? No    Social History     Tobacco Use     Smoking status: Former Smoker     Types: Cigarettes     Quit date: 2016     Years since quittin.8     Smokeless tobacco: Never Used     Tobacco comment: 6 cigarettes daily (10/9/15)   Substance Use Topics     Alcohol use: No     Drug use: No     PHQ 12/3/2018 2019 3/19/2020   PHQ-9 Total Score 3 1 3   Q9: Thoughts of better off dead/self-harm past 2 weeks Not at all Not at all Not at all     ANGELITA-7 SCORE 2018   Total Score 8         Suicide Assessment Five-step Evaluation and Treatment (SAFE-T)      Today's PHQ-2 Score:   PHQ-2 (  Pf[employer]) 2021   Q1: " Little interest or pleasure in doing things 0   Q2: Feeling down, depressed or hopeless 0   PHQ-2 Score 0   Q1: Little interest or pleasure in doing things Not at all   Q2: Feeling down, depressed or hopeless Not at all   PHQ-2 Score 0       Abuse: Current or Past (Physical, Sexual or Emotional) - No  Do you feel safe in your environment? Yes    Have you ever done Advance Care Planning? (For example, a Health Directive, POLST, or a discussion with a medical provider or your loved ones about your wishes): No, advance care planning information given to patient to review.  Patient declined advance care planning discussion at this time.    Social History     Tobacco Use     Smoking status: Former Smoker     Types: Cigarettes     Quit date: 2016     Years since quittin.8     Smokeless tobacco: Never Used     Tobacco comment: 6 cigarettes daily (10/9/15)   Substance Use Topics     Alcohol use: No         Alcohol Use 2021   Prescreen: >3 drinks/day or >7 drinks/week? No   Prescreen: >3 drinks/day or >7 drinks/week? -       Any new diagnosis of family breast, ovarian, or bowel cancer? No     Reviewed orders with patient.  Reviewed health maintenance and updated orders accordingly - Yes  Lab work is in process  BP Readings from Last 3 Encounters:   21 113/71   19 98/56   18 98/62    Wt Readings from Last 3 Encounters:   21 53.3 kg (117 lb 9.6 oz)   19 53.8 kg (118 lb 8 oz)   18 55.3 kg (122 lb)                  Patient Active Problem List   Diagnosis     Contact dermatitis     Eczema     CARDIOVASCULAR SCREENING; LDL GOAL LESS THAN 160     Mild major depression (H)     History of iron deficiency     Past Surgical History:   Procedure Laterality Date     BIOPSY         Social History     Tobacco Use     Smoking status: Former Smoker     Types: Cigarettes     Quit date: 2016     Years since quittin.8     Smokeless tobacco: Never Used     Tobacco comment: 6 cigarettes  daily (10/9/15)   Substance Use Topics     Alcohol use: No     Family History   Problem Relation Age of Onset     Hypertension Mother      Osteoporosis Mother      Unknown/Adopted Father      Heart Disease Maternal Grandfather      Cancer Maternal Grandfather         leukemia     Respiratory Paternal Grandfather         emphysema     Diabetes No family hx of      Cerebrovascular Disease No family hx of      Thyroid Disease No family hx of      Glaucoma No family hx of      Macular Degeneration No family hx of          Current Outpatient Medications   Medication Sig Dispense Refill     sertraline (ZOLOFT) 50 MG tablet Take 1 tablet (50 mg) by mouth daily 90 tablet 3     triamcinolone (KENALOG) 0.1 % external cream Apply sparingly to affected area three times daily as needed 80 g 3     TYLENOL 325 MG OR TABS 2 TABLETS EVERY 4 HOURS AS NEEDED       Allergies   Allergen Reactions     Cats      Dogs      Seasonal Allergies        Breast CA Risk Screening:  No flowsheet data found.      Mammogram Screening - Offered annual screening and updated Health Maintenance for [place] plan based on risk factor consideration    Pertinent mammograms are reviewed under the imaging tab.    History of abnormal Pap smear: NO - age 30-65 PAP every 5 years with negative HPV co-testing recommended  PAP / HPV Latest Ref Rng & Units 2/6/2017 4/21/2014 5/2/2011   PAP - NIL NIL NIL   HPV 16 DNA NEG Negative - -   HPV 18 DNA NEG Negative - -   OTHER HR HPV NEG Negative - -     Reviewed and updated as needed this visit by clinical staff  Tobacco  Allergies  Meds  Problems  Med Hx  Surg Hx  Fam Hx          Reviewed and updated as needed this visit by Provider  Tobacco  Allergies  Meds  Problems  Med Hx  Surg Hx  Fam Hx             Review of Systems   Constitutional: Negative for chills and fever.   HENT: Positive for congestion. Negative for ear pain, hearing loss and sore throat.    Eyes: Positive for visual disturbance. Negative for  "pain.   Respiratory: Negative for cough and shortness of breath.    Cardiovascular: Negative for chest pain, palpitations and peripheral edema.   Gastrointestinal: Negative for abdominal pain, constipation, diarrhea, heartburn, hematochezia and nausea.   Breasts:  Negative for tenderness, breast mass and discharge.   Genitourinary: Negative for dysuria, frequency, genital sores, hematuria, pelvic pain, urgency, vaginal bleeding and vaginal discharge.   Musculoskeletal: Positive for myalgias. Negative for arthralgias and joint swelling.   Skin: Negative for rash.   Neurological: Positive for headaches. Negative for dizziness, weakness and paresthesias.   Psychiatric/Behavioral: Negative for mood changes. The patient is nervous/anxious.           OBJECTIVE:   /71   Pulse 86   Temp 98.9  F (37.2  C) (Oral)   Ht 1.628 m (5' 4.09\")   Wt 53.3 kg (117 lb 9.6 oz)   SpO2 100%   BMI 20.13 kg/m    Physical Exam  GENERAL: healthy, alert and no distress  EYES: Eyes grossly normal to inspection, PERRL and conjunctivae and sclerae normal  HENT: normal cephalic/atraumatic, both ears: clear effusion, nose and mouth without ulcers or lesions, oropharynx clear and oral mucous membranes moist  NECK: no adenopathy, no asymmetry, masses, or scars and thyroid normal to palpation  RESP: lungs clear to auscultation - no rales, rhonchi or wheezes  BREAST: normal without masses, tenderness or nipple discharge and no palpable axillary masses or adenopathy  CV: regular rate and rhythm, normal S1 S2, no S3 or S4, no murmur, click or rub, no peripheral edema and peripheral pulses strong  ABDOMEN: soft, nontender, no hepatosplenomegaly, no masses and bowel sounds normal  MS: no gross musculoskeletal defects noted, no edema  NEURO: Normal strength and tone, mentation intact and speech normal  PSYCH: mentation appears normal, affect normal/bright    Diagnostic Test Results:  none     ASSESSMENT/PLAN:   1. Routine history and physical " "examination of adult      2. Mild recurrent major depression (H)  Stable.  Continue current treatment plan and medications.   - sertraline (ZOLOFT) 50 MG tablet; Take 1 tablet (50 mg) by mouth daily  Dispense: 90 tablet; Refill: 3    3. Anxiety  Consider increasing dose pending lab results, possible further workup for palpitations with Zio patch.  - sertraline (ZOLOFT) 50 MG tablet; Take 1 tablet (50 mg) by mouth daily  Dispense: 90 tablet; Refill: 3    4. Need for prophylactic vaccination and inoculation against influenza    - INFLUENZA VACCINE IM > 6 MONTHS VALENT IIV4 [29988]    5. Encounter for screening mammogram for breast cancer    - *MA Screening Digital Bilateral; Future    6. Fatigue, unspecified type    - TSH with free T4 reflex  - Comprehensive metabolic panel (BMP + Alb, Alk Phos, ALT, AST, Total. Bili, TP)  - CBC with platelets and differential    7. History of iron deficiency    - Ferritin  - Iron and iron binding capacity    8. Palpitations  As above.     9. Bilateral chronic serous otitis media  Patient to try over the counter claritin daily for 2 week.  If no improvement, she will contact me for ENT referral.      Patient has been advised of split billing requirements and indicates understanding: Yes  COUNSELING:  Reviewed preventive health counseling, as reflected in patient instructions       Regular exercise       Healthy diet/nutrition       Consider Hep C screening for all patients one time for ages 18-79 years       HIV screeninx in teen years, 1x in adult years, and at intervals if high risk    Estimated body mass index is 20.13 kg/m  as calculated from the following:    Height as of this encounter: 1.628 m (5' 4.09\").    Weight as of this encounter: 53.3 kg (117 lb 9.6 oz).        She reports that she quit smoking about 4 years ago. Her smoking use included cigarettes. She has never used smokeless tobacco.      Counseling Resources:  ATP IV Guidelines  Pooled Cohorts Equation " Calculator  Breast Cancer Risk Calculator  BRCA-Related Cancer Risk Assessment: FHS-7 Tool  FRAX Risk Assessment  ICSI Preventive Guidelines  Dietary Guidelines for Americans, 2010  USDA's MyPlate  ASA Prophylaxis  Lung CA Screening    JOSIAH Ochoa CNP  [facility]

## 2021-02-23 NOTE — RESULT ENCOUNTER NOTE
Dear Shruthi,    Your recent test results are attached.      No anemia.    If you have any questions please feel free to contact [phone] or myself via [employer].    Sincerely,  Pauline Monroy, CNP

## 2021-02-24 NOTE — RESULT ENCOUNTER NOTE
Dear Shruthi,    Your recent test results are attached.      Normal iron stores.  Normal thyroid.  Normal kidney function and electrolytes.  Please let me know via [employer] if you would like to proceed with heart monitor or trial of increasing sertraline to help with anxiety.    If you have any questions please feel free to contact [phone] or myself via [employer].    Sincerely,  Pauline Monryo, CNP

## 2021-03-12 ENCOUNTER — MYC MEDICAL ADVICE (OUTPATIENT)
Dept: FAMILY MEDICINE | Facility: CLINIC | Age: 41
End: 2021-03-12

## 2021-03-12 DIAGNOSIS — H65.23 BILATERAL CHRONIC SEROUS OTITIS MEDIA: Primary | ICD-10-CM

## 2021-03-17 ENCOUNTER — OFFICE VISIT (OUTPATIENT)
Dept: OPTOMETRY | Facility: CLINIC | Age: 41
End: 2021-03-17
Payer: COMMERCIAL

## 2021-03-17 DIAGNOSIS — H52.4 PRESBYOPIA: ICD-10-CM

## 2021-03-17 DIAGNOSIS — H52.223 REGULAR ASTIGMATISM OF BOTH EYES: ICD-10-CM

## 2021-03-17 DIAGNOSIS — H52.13 MYOPIA OF BOTH EYES: ICD-10-CM

## 2021-03-17 DIAGNOSIS — Z01.00 ENCOUNTER FOR EXAMINATION OF EYES AND VISION WITHOUT ABNORMAL FINDINGS: Primary | ICD-10-CM

## 2021-03-17 PROCEDURE — 92015 DETERMINE REFRACTIVE STATE: CPT | Performed by: OPTOMETRIST

## 2021-03-17 PROCEDURE — 92004 COMPRE OPH EXAM NEW PT 1/>: CPT | Performed by: OPTOMETRIST

## 2021-03-17 ASSESSMENT — VISUAL ACUITY
OS_CC: 20/20
CORRECTION_TYPE: GLASSES
OD_CC: J2
METHOD: SNELLEN - LINEAR
OD_CC: 20/20
OS_CC: J3

## 2021-03-17 ASSESSMENT — CONF VISUAL FIELD
OD_NORMAL: 1
OS_NORMAL: 1

## 2021-03-17 ASSESSMENT — TONOMETRY
OS_IOP_MMHG: 14
OD_IOP_MMHG: 12
IOP_METHOD: APPLANATION

## 2021-03-17 ASSESSMENT — SLIT LAMP EXAM - LIDS
COMMENTS: NORMAL
COMMENTS: NORMAL

## 2021-03-17 ASSESSMENT — REFRACTION_WEARINGRX
OD_AXIS: 110
OS_SPHERE: -2.50
SPECS_TYPE: SVL
OS_AXIS: 070
OD_CYLINDER: +1.25
OS_CYLINDER: +1.25
OD_SPHERE: -2.50

## 2021-03-17 ASSESSMENT — CUP TO DISC RATIO
OS_RATIO: 0.2
OD_RATIO: 0.2

## 2021-03-17 ASSESSMENT — REFRACTION_MANIFEST
OD_SPHERE: -3.00
OD_ADD: +1.25
OS_CYLINDER: +1.00
OD_CYLINDER: +1.25
OS_AXIS: 065
OS_SPHERE: -3.00
OS_ADD: +1.25
OD_AXIS: 114

## 2021-03-17 ASSESSMENT — EXTERNAL EXAM - RIGHT EYE: OD_EXAM: NORMAL

## 2021-03-17 ASSESSMENT — EXTERNAL EXAM - LEFT EYE: OS_EXAM: NORMAL

## 2021-03-17 NOTE — PATIENT INSTRUCTIONS
Shruthi was advised of today's exam findings.  Fill glasses prescription  Allow 2 weeks to adapt to change in glasses  Wear glasses full time  Copy of glasses Rx provided today.    Return in 1 years for eye exam, or sooner if needed.    Dilation deferred today. Patient educated on the importance of dilation in order to fully assess the internal ocular health. Patient voiced understanding. Return to clinic to complete the dilated portion of the examination, if desired.       João Harrington O.D.  [address]  [place]  [address]    [phone]

## 2021-03-17 NOTE — PROGRESS NOTES
Chief Complaint   Patient presents with     Annual Eye Exam        Last Eye Exam: 5 years ago with Dr. Rae  Dilated Previously: Declined dilation today    What are you currently using to see?  glasses       Distance Vision Acuity: Noticed gradual change in both eyes    Near Vision Acuity: Not satisfied     Eye Comfort: watery  Do you use eye drops? : Yes as needed   Occupation or Hobbies: [employer]      Medical, surgical and family histories reviewed and updated 3/17/2021.       OBJECTIVE: See Ophthalmology exam    ASSESSMENT:    ICD-10-CM    1. Encounter for examination of eyes and vision without abnormal findings  Z01.00    2. Myopia of both eyes  H52.13    3. Regular astigmatism of both eyes  H52.223    4. Presbyopia  H52.4       PLAN:     Patient Instructions   Shruthi was advised of today's exam findings.  Fill glasses prescription  Allow 2 weeks to adapt to change in glasses  Wear glasses full time  Copy of glasses Rx provided today.    Return in 1 years for eye exam, or sooner if needed.    Dilation deferred today. Patient educated on the importance of dilation in order to fully assess the internal ocular health. Patient voiced understanding. Return to clinic to complete the dilated portion of the examination, if desired.       João Harrington O.D.  [facility]  [address] [place] [address]. [place]  [address] [phone]

## 2021-03-17 NOTE — LETTER
3/17/2021         RE: Shruthi Bedoya  [address] [facility] [id number]        Dear Colleague,    Thank you for referring your patient, Shruthi Bedoya, to the [facility]. Please see a copy of my visit note below.    Chief Complaint   Patient presents with     Annual Eye Exam        Last Eye Exam: 5 years ago with Dr. Rae  Dilated Previously: Declined dilation today    What are you currently using to see?  glasses       Distance Vision Acuity: Noticed gradual change in both eyes    Near Vision Acuity: Not satisfied     Eye Comfort: watery  Do you use eye drops? : Yes as needed   Occupation or Hobbies: [employer]      Medical, surgical and family histories reviewed and updated 3/17/2021.       OBJECTIVE: See Ophthalmology exam    ASSESSMENT:    ICD-10-CM    1. Encounter for examination of eyes and vision without abnormal findings  Z01.00    2. Myopia of both eyes  H52.13    3. Regular astigmatism of both eyes  H52.223    4. Presbyopia  H52.4       PLAN:     Patient Instructions   Shruthi was advised of today's exam findings.  Fill glasses prescription  Allow 2 weeks to adapt to change in glasses  Wear glasses full time  Copy of glasses Rx provided today.    Return in 1 years for eye exam, or sooner if needed.    Dilation deferred today. Patient educated on the importance of dilation in order to fully assess the internal ocular health. Patient voiced understanding. Return to clinic to complete the dilated portion of the examination, if desired.       João Harrington O.D.  [address] [place]  [address]    [phone]           Again, thank you for allowing me to participate in the care of your patient.        Sincerely,        João Harrington, MARK

## 2021-04-16 ENCOUNTER — OFFICE VISIT (OUTPATIENT)
Dept: AUDIOLOGY | Facility: CLINIC | Age: 41
End: 2021-04-16
Payer: COMMERCIAL

## 2021-04-16 ENCOUNTER — OFFICE VISIT (OUTPATIENT)
Dept: OTOLARYNGOLOGY | Facility: CLINIC | Age: 41
End: 2021-04-16
Payer: COMMERCIAL

## 2021-04-16 VITALS
SYSTOLIC BLOOD PRESSURE: 103 MMHG | DIASTOLIC BLOOD PRESSURE: 65 MMHG | OXYGEN SATURATION: 98 % | RESPIRATION RATE: 16 BRPM | HEART RATE: 80 BPM

## 2021-04-16 DIAGNOSIS — H92.03 OTALGIA, BILATERAL: Primary | ICD-10-CM

## 2021-04-16 DIAGNOSIS — H93.8X3 PLUGGED FEELING IN EAR, BILATERAL: Primary | ICD-10-CM

## 2021-04-16 PROCEDURE — 92557 COMPREHENSIVE HEARING TEST: CPT | Performed by: AUDIOLOGIST

## 2021-04-16 PROCEDURE — 99207 PR NO CHARGE LOS: CPT | Performed by: AUDIOLOGIST

## 2021-04-16 PROCEDURE — 92550 TYMPANOMETRY & REFLEX THRESH: CPT | Performed by: AUDIOLOGIST

## 2021-04-16 PROCEDURE — 99203 OFFICE O/P NEW LOW 30 MIN: CPT | Performed by: OTOLARYNGOLOGY

## 2021-04-16 RX ORDER — LORATADINE 10 MG/1
10 TABLET ORAL DAILY
COMMUNITY

## 2021-04-16 NOTE — PROGRESS NOTES
I am seeing this patient in consultation for chronic otitis media at the request of the provider Dr. Tara Parsons.    Chief Complaint - ear pain    History of Present Illness - Shruthi Bedoya is a 41 year old female who presents with the new onset of ear pressure and sometimes pain. Left ear more than right, but feels them in both. Gets a fullness. Has been going on for a year. Tried claritin, no help. She sometimes feels it in upper neck. She has TMJ. It has been worse lately with her anxiety. She clenches. They have never had a history of ear disease in the past, no previous ear surgery or chronic ear infection as an adult. She feels she has noise sensitivity.     Past Medical History -   Patient Active Problem List   Diagnosis     Contact dermatitis     Eczema     CARDIOVASCULAR SCREENING; LDL GOAL LESS THAN 160     Mild major depression (H)     History of iron deficiency       Current Medications -   Current Outpatient Medications:      loratadine (CLARITIN) 10 MG tablet, Take 10 mg by mouth daily, Disp: , Rfl:      sertraline (ZOLOFT) 50 MG tablet, Take 1 tablet (50 mg) by mouth daily, Disp: 90 tablet, Rfl: 3     triamcinolone (KENALOG) 0.1 % external cream, Apply sparingly to affected area three times daily as needed, Disp: 80 g, Rfl: 3     TYLENOL 325 MG OR TABS, 2 TABLETS EVERY 4 HOURS AS NEEDED, Disp: , Rfl:     Allergies -   Allergies   Allergen Reactions     Cats      Dogs      Seasonal Allergies        Social History -   Social History     Socioeconomic History     Marital status:      Spouse name: Not on file     Number of children: Not on file     Years of education: Not on file     Highest education level: Not on file   Occupational History     Not on file   Social Needs     Financial resource strain: Not on file     Food insecurity     Worry: Not on file     Inability: Not on file     Transportation needs     Medical: Not on file     Non-medical: Not on file   Tobacco Use     Smoking  status: Former Smoker     Types: Cigarettes     Quit date: 2016     Years since quittin.0     Smokeless tobacco: Never Used     Tobacco comment: 6 cigarettes daily (10/9/15)   Substance and Sexual Activity     Alcohol use: No     Drug use: No     Sexual activity: Yes     Partners: Male     Birth control/protection: Male Surgical   Lifestyle     Physical activity     Days per week: Not on file     Minutes per session: Not on file     Stress: Not on file   Relationships     Social connections     Talks on phone: Not on file     Gets together: Not on file     Attends Roman [employer] service: Not on file     Active member of club or organization: Not on file     Attends meetings of clubs or organizations: Not on file     Relationship status: Not on file     Intimate partner violence     Fear of current or ex partner: Not on file     Emotionally abused: Not on file     Physically abused: Not on file     Forced sexual activity: Not on file   Other Topics Concern     Parent/sibling w/ CABG, MI or angioplasty before 65F 55M? No   Social History Narrative     Not on file       Family History -   Family History   Problem Relation Age of Onset     Hypertension Mother      Osteoporosis Mother      Unknown/Adopted Father      Heart Disease Maternal Grandfather      Cancer Maternal Grandfather         leukemia     Respiratory Paternal Grandfather         emphysema     Diabetes No family hx of      Cerebrovascular Disease No family hx of      Thyroid Disease No family hx of      Glaucoma No family hx of      Macular Degeneration No family hx of        Review of Systems - As per HPI and PMHx, otherwise 7 system review is negative.    Physical Exam  /65   Pulse 80   Resp 16   SpO2 98%   General - The patient is in no distress.  Alert and oriented to person and place, answers questions and cooperates with examination appropriately.   Neurologic - CN II-XII are grossly intact, no focal neurologic deficits. HB 1 out of 6  bilaterally.  Voice and Breathing - The patient was breathing comfortably without the use of accessory muscles. There was no wheezing, stridor, or stertor.  The patients voice was clear and strong.  Eyes - Extraocular movements intact. Sclera were not icteric or injected, conjunctiva were pink and moist.  Ears - clean canals. The tympanic membranes are normal in appearance, bony landmarks are intact.  No retraction, perforation, or masses. No fluid or purulence was seen in the external canal or the middle ear. No evidence of infection of the middle ear or external canal, cerumen was normal in appearance.  Mouth - Dentition in fair condition, no masses, ulcerations, or erosions noted on examination of mucosa. The tongue is mobile and midline, and the uvula is midline on elevation. The dental exam was notable for wear facets that were consistent with bruxism.  Palpation of the TMJs were tender and she has crepitus.  Throat - The walls of the oropharynx were smooth, symmetric, and had no lesions or ulcerations. The uvula was midline on elevation.    Neck - Palpation of the occipital, submental, submandibular, internal jugular chain, and supraclavicular nodes did not demonstrate any abnormal lymph nodes or masses. Palpation of the thyroid was soft and smooth, with no nodules or goiter appreciated.  The trachea was mobile and midline.        A/P - Shruthi Bedoya is a 41 year old female who presents with ear fullness and intermittent otalgia.  Based on today's history and physical exam, I can find no evidence of middle ear pathology or eustachian tube dysfunction.  At this point my primary diagnosis is of temporomandibular syndrome. She has had this in the past. She grinds a lot. I have discussed the etiology of TMJ, and the reasons why referred pain can mimic symptoms of ear disease, headaches, and even sinusitis.  I have given the patient an instructional sheet of things to be tried at home. Finally, I counseled the  patient that should the therapy not help, or should the symptoms change, that they should return to me, or contact me for a referral to a TMJ specialist. I gave a referral to PT as well.       Caesar Hinojosa MD  Otolaryngology  [facility]

## 2021-04-16 NOTE — PROGRESS NOTES
AUDIOLOGY REPORT:    Patient was referred from ENT by Dr. Hinojosa for audiology evaluation. The patient reports that she has been having problems with her ears for around a year, noting that they constantly feel clogged and she has some discomfort. She notes an occasional tunnel sound quality when they feel particularly clogged, but she has also been more sensitive to sound. The patient reports occasional tinnitus that can be in either ear. 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 The patient reports TMJ concerns. She reports that she does not have any prior history of ear problems or ear surgery, and she does not have a family history of hearing loss.    Testing:    Otoscopy:   Otoscopic exam indicates ears are clear of cerumen bilaterally     Tympanograms:    RIGHT: normal eardrum mobility     LEFT:   normal eardrum mobility    Reflexes (reported by stimulus ear):  RIGHT: Ipsilateral is present at normal levels  RIGHT: Contralateral is present at normal levels  LEFT:   Ipsilateral is present at normal levels  LEFT:   Contralateral is present at normal levels    Thresholds:   Pure Tone Thresholds assessed using conventional audiometry with good reliability from 250-8000 Hz bilaterally using insert earphones and circumaural headphones     RIGHT:  normal hearing sensitivity at all tested frequencies    LEFT:    normal hearing sensitivity at all tested frequencies    Speech Reception Threshold:    RIGHT: 5 dB HL    LEFT:   0 dB HL  Results are in agreement with pure tone average.     Word Recognition Score:     RIGHT: 100% at 50 dB HL using NU-6 recorded word list.    LEFT:   100% at 50 dB HL using NU-6 recorded word list.    Discussed results with the patient.     Patient was returned to ENT for follow up.     Patrick Franz, CCC-A  Licensed Audiologist #[id number]  4/16/2021

## 2021-04-16 NOTE — LETTER
4/16/2021         RE: Shruthi Bedoya  [address] [facility] [address]        Dear Colleague,    Thank you for referring your patient, Shruthi Bedoya, to the [facility]. Please see a copy of my visit note below.    I am seeing this patient in consultation for chronic otitis media at the request of the provider Dr. Tara Parsons.    Chief Complaint - ear pain    History of Present Illness - Shruthi Bedoya is a 41 year old female who presents with the new onset of ear pressure and sometimes pain. Left ear more than right, but feels them in both. Gets a fullness. Has been going on for a year. Tried claritin, no help. She sometimes feels it in upper neck. She has TMJ. It has been worse lately with her anxiety. She clenches. They have never had a history of ear disease in the past, no previous ear surgery or chronic ear infection as an adult. She feels she has noise sensitivity.     Past Medical History -   Patient Active Problem List   Diagnosis     Contact dermatitis     Eczema     CARDIOVASCULAR SCREENING; LDL GOAL LESS THAN 160     Mild major depression (H)     History of iron deficiency       Current Medications -   Current Outpatient Medications:      loratadine (CLARITIN) 10 MG tablet, Take 10 mg by mouth daily, Disp: , Rfl:      sertraline (ZOLOFT) 50 MG tablet, Take 1 tablet (50 mg) by mouth daily, Disp: 90 tablet, Rfl: 3     triamcinolone (KENALOG) 0.1 % external cream, Apply sparingly to affected area three times daily as needed, Disp: 80 g, Rfl: 3     TYLENOL 325 MG OR TABS, 2 TABLETS EVERY 4 HOURS AS NEEDED, Disp: , Rfl:     Allergies -   Allergies   Allergen Reactions     Cats      Dogs      Seasonal Allergies        Social History -   Social History     Socioeconomic History     Marital status:      Spouse name: Not on file     Number of children: Not on file     Years of education: Not on file     Highest education level: Not on file   Occupational History      Not on file   Social Needs     Financial resource strain: Not on file     Food insecurity     Worry: Not on file     Inability: Not on file     Transportation needs     Medical: Not on file     Non-medical: Not on file   Tobacco Use     Smoking status: Former Smoker     Types: Cigarettes     Quit date: 2016     Years since quittin.0     Smokeless tobacco: Never Used     Tobacco comment: 6 cigarettes daily (10/9/15)   Substance and Sexual Activity     Alcohol use: No     Drug use: No     Sexual activity: Yes     Partners: Male     Birth control/protection: Male Surgical   Lifestyle     Physical activity     Days per week: Not on file     Minutes per session: Not on file     Stress: Not on file   Relationships     Social connections     Talks on phone: Not on file     Gets together: Not on file     Attends Buddhist service: Not on file     Active member of club or organization: Not on file     Attends meetings of clubs or organizations: Not on file     Relationship status: Not on file     Intimate partner violence     Fear of current or ex partner: Not on file     Emotionally abused: Not on file     Physically abused: Not on file     Forced sexual activity: Not on file   Other Topics Concern     Parent/sibling w/ CABG, MI or angioplasty before 65F 55M? No   Social History Narrative     Not on file       Family History -   Family History   Problem Relation Age of Onset     Hypertension Mother      Osteoporosis Mother      Unknown/Adopted Father      Heart Disease Maternal Grandfather      Cancer Maternal Grandfather         leukemia     Respiratory Paternal Grandfather         emphysema     Diabetes No family hx of      Cerebrovascular Disease No family hx of      Thyroid Disease No family hx of      Glaucoma No family hx of      Macular Degeneration No family hx of        Review of Systems - As per HPI and PMHx, otherwise 7 system review is negative.    Physical Exam  /65   Pulse 80   Resp 16    SpO2 98%   General - The patient is in no distress.  Alert and oriented to person and place, answers questions and cooperates with examination appropriately.   Neurologic - CN II-XII are grossly intact, no focal neurologic deficits. HB 1 out of 6 bilaterally.  Voice and Breathing - The patient was breathing comfortably without the use of accessory muscles. There was no wheezing, stridor, or stertor.  The patients voice was clear and strong.  Eyes - Extraocular movements intact. Sclera were not icteric or injected, conjunctiva were pink and moist.  Ears - clean canals. The tympanic membranes are normal in appearance, bony landmarks are intact.  No retraction, perforation, or masses. No fluid or purulence was seen in the external canal or the middle ear. No evidence of infection of the middle ear or external canal, cerumen was normal in appearance.  Mouth - Dentition in fair condition, no masses, ulcerations, or erosions noted on examination of mucosa. The tongue is mobile and midline, and the uvula is midline on elevation. The dental exam was notable for wear facets that were consistent with bruxism.  Palpation of the TMJs were tender and she has crepitus.  Throat - The walls of the oropharynx were smooth, symmetric, and had no lesions or ulcerations. The uvula was midline on elevation.    Neck - Palpation of the occipital, submental, submandibular, internal jugular chain, and supraclavicular nodes did not demonstrate any abnormal lymph nodes or masses. Palpation of the thyroid was soft and smooth, with no nodules or goiter appreciated.  The trachea was mobile and midline.        A/P - Shruthi Bedoya is a 41 year old female who presents with ear fullness and intermittent otalgia.  Based on today's history and physical exam, I can find no evidence of middle ear pathology or eustachian tube dysfunction.  At this point my primary diagnosis is of temporomandibular syndrome. She has had this in the past. She grinds a  lot. I have discussed the etiology of TMJ, and the reasons why referred pain can mimic symptoms of ear disease, headaches, and even sinusitis.  I have given the patient an instructional sheet of things to be tried at home. Finally, I counseled the patient that should the therapy not help, or should the symptoms change, that they should return to me, or contact me for a referral to a TMJ specialist. I gave a referral to PT as well.       Caesar Hinojosa MD  Otolaryngology  [facility]        Again, thank you for allowing me to participate in the care of your patient.        Sincerely,        Caesar Hinojosa MD

## 2021-09-25 ENCOUNTER — HEALTH MAINTENANCE LETTER (OUTPATIENT)
Age: 41
End: 2021-09-25

## 2022-03-01 ENCOUNTER — MYC MEDICAL ADVICE (OUTPATIENT)
Dept: FAMILY MEDICINE | Facility: CLINIC | Age: 42
End: 2022-03-01
Payer: COMMERCIAL

## 2022-03-01 DIAGNOSIS — F41.9 ANXIETY: ICD-10-CM

## 2022-03-01 DIAGNOSIS — F33.0 MILD RECURRENT MAJOR DEPRESSION (H): ICD-10-CM

## 2022-03-02 NOTE — TELEPHONE ENCOUNTER
Medication is being filled for 1 time refill only due to:  Patient needs to be seen because it has been more than one year since last visit. due for PHQ9    Juan Francisco Sawyer RN  [facility]

## 2022-05-07 ENCOUNTER — HEALTH MAINTENANCE LETTER (OUTPATIENT)
Age: 42
End: 2022-05-07

## 2022-05-30 DIAGNOSIS — F33.0 MILD RECURRENT MAJOR DEPRESSION (H): ICD-10-CM

## 2022-05-30 DIAGNOSIS — F41.9 ANXIETY: ICD-10-CM

## 2022-06-01 NOTE — TELEPHONE ENCOUNTER
"Routing refill request to provider for review/approval because:  Eliana given x1 and patient did not follow up, please advise  Patient needs to be seen because it has been more than 1 year since last office visit.    Requested Prescriptions   Pending Prescriptions Disp Refills     sertraline (ZOLOFT) 50 MG tablet [Pharmacy Med Name: SERTRALINE HCL 50 MG TABLET] 90 tablet 0     Sig: TAKE 1 TABLET BY MOUTH EVERY DAY       SSRIs Protocol Failed - 5/30/2022 12:11 AM        Failed - Recent (6 mo) or future (30 days) visit within the authorizing provider's specialty     Patient had office visit in the last 6 months or has a visit in the next 30 days with authorizing provider or within the authorizing provider's specialty.  See \"Patient Info\" tab in inbasket, or \"Choose Columns\" in Meds & Orders section of the refill encounter.            Passed - PHQ-9 score less than 5 in past 6 months     Please review last PHQ-9 score.           Passed - Medication is active on med list        Passed - Patient is age 18 or older        Passed - No active pregnancy on record        Passed - No positive pregnancy test in last 12 months           Thanks,    Som LEONARD RN  [facility]        "

## 2022-07-01 ENCOUNTER — MYC MEDICAL ADVICE (OUTPATIENT)
Dept: FAMILY MEDICINE | Facility: CLINIC | Age: 42
End: 2022-07-01

## 2022-07-01 DIAGNOSIS — F33.0 MILD RECURRENT MAJOR DEPRESSION (H): ICD-10-CM

## 2022-07-01 DIAGNOSIS — F41.9 ANXIETY: ICD-10-CM

## 2022-07-01 NOTE — TELEPHONE ENCOUNTER
Called and spoke with patient and got her scheduled on 07/25/2022 with Dr. Ramon in [place] at 8am arrival time of 7:40am.      Kizzy Krishnan [facility]

## 2022-07-01 NOTE — TELEPHONE ENCOUNTER
Patient is scheduled for an appointment with Dr. Ramon in [place] on 07/25/2022 at 8am. Will run out of medication before than requesting a refill to hold her until the appointment. Call patient at her mobile number.    Kizzy Krishnan [facility]

## 2022-07-01 NOTE — TELEPHONE ENCOUNTER
"Requested Prescriptions   Pending Prescriptions Disp Refills     sertraline (ZOLOFT) 50 MG tablet 90 tablet 0     Sig: Take 1 tablet (50 mg) by mouth daily       SSRIs Protocol Failed - 7/1/2022  2:30 PM        Failed - Recent (6 mo) or future (30 days) visit within the authorizing provider's specialty     Patient had office visit in the last 6 months or has a visit in the next 30 days with authorizing provider or within the authorizing provider's specialty.  See \"Patient Info\" tab in inbasket, or \"Choose Columns\" in Meds & Orders section of the refill encounter.            Passed - PHQ-9 score less than 5 in past 6 months     Please review last PHQ-9 score.           Passed - Medication is active on med list        Passed - Patient is age 18 or older        Passed - No active pregnancy on record        Passed - No positive pregnancy test in last 12 months           Routing refill request to provider for review/approval because:  Patient needs to be seen because:  6 mo f/u    Lexus Foster RN  [facility]           "

## 2022-07-25 ENCOUNTER — OFFICE VISIT (OUTPATIENT)
Dept: FAMILY MEDICINE | Facility: CLINIC | Age: 42
End: 2022-07-25
Payer: COMMERCIAL

## 2022-07-25 VITALS
SYSTOLIC BLOOD PRESSURE: 123 MMHG | HEIGHT: 65 IN | TEMPERATURE: 98.8 F | HEART RATE: 97 BPM | OXYGEN SATURATION: 97 % | RESPIRATION RATE: 17 BRPM | BODY MASS INDEX: 19.33 KG/M2 | WEIGHT: 116 LBS | DIASTOLIC BLOOD PRESSURE: 75 MMHG

## 2022-07-25 DIAGNOSIS — L24.5 IRRITANT CONTACT DERMATITIS DUE TO OTHER CHEMICAL PRODUCTS: ICD-10-CM

## 2022-07-25 DIAGNOSIS — Z13.220 SCREENING CHOLESTEROL LEVEL: ICD-10-CM

## 2022-07-25 DIAGNOSIS — F41.9 ANXIETY: ICD-10-CM

## 2022-07-25 DIAGNOSIS — L65.9 HAIR LOSS: ICD-10-CM

## 2022-07-25 DIAGNOSIS — R53.83 OTHER FATIGUE: ICD-10-CM

## 2022-07-25 DIAGNOSIS — R00.2 PALPITATIONS: ICD-10-CM

## 2022-07-25 DIAGNOSIS — F33.0 MILD RECURRENT MAJOR DEPRESSION (H): ICD-10-CM

## 2022-07-25 DIAGNOSIS — Z12.4 SCREENING FOR MALIGNANT NEOPLASM OF CERVIX: ICD-10-CM

## 2022-07-25 DIAGNOSIS — Z00.00 ROUTINE GENERAL MEDICAL EXAMINATION AT A HEALTH CARE FACILITY: Primary | ICD-10-CM

## 2022-07-25 DIAGNOSIS — R22.1 NECK FULLNESS: ICD-10-CM

## 2022-07-25 LAB
ALBUMIN SERPL-MCNC: 3.9 G/DL (ref 3.4–5)
ALP SERPL-CCNC: 38 U/L (ref 40–150)
ALT SERPL W P-5'-P-CCNC: 18 U/L (ref 0–50)
ANION GAP SERPL CALCULATED.3IONS-SCNC: 2 MMOL/L (ref 3–14)
AST SERPL W P-5'-P-CCNC: 12 U/L (ref 0–45)
BILIRUB SERPL-MCNC: 0.8 MG/DL (ref 0.2–1.3)
BUN SERPL-MCNC: 14 MG/DL (ref 7–30)
CALCIUM SERPL-MCNC: 8.5 MG/DL (ref 8.5–10.1)
CHLORIDE BLD-SCNC: 107 MMOL/L (ref 94–109)
CHOLEST SERPL-MCNC: 164 MG/DL
CO2 SERPL-SCNC: 29 MMOL/L (ref 20–32)
CREAT SERPL-MCNC: 0.74 MG/DL (ref 0.52–1.04)
DEPRECATED CALCIDIOL+CALCIFEROL SERPL-MC: 33 UG/L (ref 20–75)
ERYTHROCYTE [DISTWIDTH] IN BLOOD BY AUTOMATED COUNT: 12.3 % (ref 10–15)
FASTING STATUS PATIENT QL REPORTED: NO
FOLATE SERPL-MCNC: 12.5 NG/ML (ref 4.6–34.8)
GFR SERPL CREATININE-BSD FRML MDRD: >90 ML/MIN/1.73M2
GLUCOSE BLD-MCNC: 91 MG/DL (ref 70–99)
HCT VFR BLD AUTO: 35.7 % (ref 35–47)
HDLC SERPL-MCNC: 57 MG/DL
HGB BLD-MCNC: 12.1 G/DL (ref 11.7–15.7)
LDLC SERPL CALC-MCNC: 92 MG/DL
MCH RBC QN AUTO: 28.9 PG (ref 26.5–33)
MCHC RBC AUTO-ENTMCNC: 33.9 G/DL (ref 31.5–36.5)
MCV RBC AUTO: 85 FL (ref 78–100)
NONHDLC SERPL-MCNC: 107 MG/DL
PLATELET # BLD AUTO: 327 10E3/UL (ref 150–450)
POTASSIUM BLD-SCNC: 3.6 MMOL/L (ref 3.4–5.3)
PROT SERPL-MCNC: 7.2 G/DL (ref 6.8–8.8)
RBC # BLD AUTO: 4.19 10E6/UL (ref 3.8–5.2)
SODIUM SERPL-SCNC: 138 MMOL/L (ref 133–144)
T4 SERPL-MCNC: 5.6 UG/DL (ref 4.5–11.7)
TRIGL SERPL-MCNC: 76 MG/DL
TSH SERPL DL<=0.005 MIU/L-ACNC: 1.79 MU/L (ref 0.4–4)
VIT B12 SERPL-MCNC: 694 PG/ML (ref 232–1245)
WBC # BLD AUTO: 6.1 10E3/UL (ref 4–11)

## 2022-07-25 PROCEDURE — 93000 ELECTROCARDIOGRAM COMPLETE: CPT | Performed by: FAMILY MEDICINE

## 2022-07-25 PROCEDURE — 82607 VITAMIN B-12: CPT | Performed by: FAMILY MEDICINE

## 2022-07-25 PROCEDURE — 85027 COMPLETE CBC AUTOMATED: CPT | Performed by: FAMILY MEDICINE

## 2022-07-25 PROCEDURE — 84481 FREE ASSAY (FT-3): CPT | Performed by: FAMILY MEDICINE

## 2022-07-25 PROCEDURE — 82746 ASSAY OF FOLIC ACID SERUM: CPT | Performed by: FAMILY MEDICINE

## 2022-07-25 PROCEDURE — 36415 COLL VENOUS BLD VENIPUNCTURE: CPT | Performed by: FAMILY MEDICINE

## 2022-07-25 PROCEDURE — 99214 OFFICE O/P EST MOD 30 MIN: CPT | Mod: 25 | Performed by: FAMILY MEDICINE

## 2022-07-25 PROCEDURE — 80053 COMPREHEN METABOLIC PANEL: CPT | Performed by: FAMILY MEDICINE

## 2022-07-25 PROCEDURE — 87624 HPV HI-RISK TYP POOLED RSLT: CPT | Performed by: FAMILY MEDICINE

## 2022-07-25 PROCEDURE — G0145 SCR C/V CYTO,THINLAYER,RESCR: HCPCS | Performed by: FAMILY MEDICINE

## 2022-07-25 PROCEDURE — 99396 PREV VISIT EST AGE 40-64: CPT | Performed by: FAMILY MEDICINE

## 2022-07-25 PROCEDURE — 84443 ASSAY THYROID STIM HORMONE: CPT | Performed by: FAMILY MEDICINE

## 2022-07-25 PROCEDURE — 84436 ASSAY OF TOTAL THYROXINE: CPT | Performed by: FAMILY MEDICINE

## 2022-07-25 PROCEDURE — 82306 VITAMIN D 25 HYDROXY: CPT | Performed by: FAMILY MEDICINE

## 2022-07-25 PROCEDURE — 80061 LIPID PANEL: CPT | Performed by: FAMILY MEDICINE

## 2022-07-25 RX ORDER — TRIAMCINOLONE ACETONIDE 1 MG/G
CREAM TOPICAL
Qty: 80 G | Refills: 0 | Status: SHIPPED | OUTPATIENT
Start: 2022-07-25 | End: 2022-12-04

## 2022-07-25 ASSESSMENT — ENCOUNTER SYMPTOMS
WEAKNESS: 0
ABDOMINAL PAIN: 0
PALPITATIONS: 0
COUGH: 0
NERVOUS/ANXIOUS: 1
NAUSEA: 0
MYALGIAS: 0
ARTHRALGIAS: 0
HEADACHES: 0
BREAST MASS: 0
FREQUENCY: 0
DIZZINESS: 0
JOINT SWELLING: 0
EYE PAIN: 0
FEVER: 0
CHILLS: 0
HEMATURIA: 0
SORE THROAT: 0
DYSURIA: 0
PARESTHESIAS: 0
SHORTNESS OF BREATH: 0
DIARRHEA: 0
HEARTBURN: 0
CONSTIPATION: 0
HEMATOCHEZIA: 0

## 2022-07-25 ASSESSMENT — PAIN SCALES - GENERAL: PAINLEVEL: NO PAIN (0)

## 2022-07-25 NOTE — PROGRESS NOTES
SUBJECTIVE:   CC: Shruthi Bedoya is an 42 year old woman who presents for preventive health visit.       Patient has been advised of split billing requirements and indicates understanding: Yes  Healthy Habits:     Getting at least 3 servings of Calcium per day:  NO    Bi-annual eye exam:  NO    Dental care twice a year:  NO    Sleep apnea or symptoms of sleep apnea:  Daytime drowsiness    Diet:  Regular (no restrictions)    Frequency of exercise:  1 day/week    Duration of exercise:  Less than 15 minutes    Taking medications regularly:  Yes    Medication side effects:  None    PHQ-2 Total Score: 0    Additional concerns today:  Yes    Pt with lots of concerns  States just does not feel well but has hard time clarifying symptoms or stating what does not feel well  She had work up last year and was normal    She is having some intermittent palpitations where it feels like her heart is racing  Occurs daily to every other day for brief episodes. No other associated symptoms  Pt c/o increased anxiety. Is on zoloft and that controls her depression but feels her anxiety has gotten worse. Discussed she could consider increasing her zoloft  Pt c/o brain fog, fatigue and hair loss  She also c/o neck fullness or pain .  This occurs in back and front  Back feels better with massage but does not get better in front with massage or ibuprofen          Today's PHQ-2 Score:   PHQ-2 ( 1999 [employer]) 7/25/2022   Q1: Little interest or pleasure in doing things 0   Q2: Feeling down, depressed or hopeless 0   PHQ-2 Score 0   PHQ-2 Total Score (12-17 Years)- Positive if 3 or more points; Administer PHQ-A if positive -   Q1: Little interest or pleasure in doing things Not at all   Q2: Feeling down, depressed or hopeless Not at all   PHQ-2 Score 0       Abuse: Current or Past (Physical, Sexual or Emotional) - No  Do you feel safe in your environment? Yes        Social History     Tobacco Use     Smoking status: Former Smoker     Types:  Cigarettes     Quit date: 2016     Years since quittin.3     Smokeless tobacco: Never Used     Tobacco comment: 6 cigarettes daily (10/9/15)   Substance Use Topics     Alcohol use: No     If you drink alcohol do you typically have >3 drinks per day or >7 drinks per week? No    Alcohol Use 2022   Prescreen: >3 drinks/day or >7 drinks/week? No   Prescreen: >3 drinks/day or >7 drinks/week? -       Reviewed orders with patient.  Reviewed health maintenance and updated orders accordingly - Yes  Lab work is in process    Breast Cancer Screening:    FHS-7: No flowsheet data found.    Mammogram Screening - Offered annual screening and updated Health Maintenance for mutual plan based on risk factor consideration    Pertinent mammograms are reviewed under the imaging tab.    History of abnormal Pap smear: NO - age 30-65 PAP every 5 years with negative HPV co-testing recommended  PAP / HPV Latest Ref Rng & Units 2017   PAP (Historical) - NIL NIL NIL   HPV16 NEG Negative - -   HPV18 NEG Negative - -   HRHPV NEG Negative - -     Reviewed and updated as needed this visit by clinical staff   Tobacco  Allergies  Meds   Med Hx  Surg Hx  Fam Hx  Soc Hx          Reviewed and updated as needed this visit by Provider                       Review of Systems   Constitutional: Negative for chills and fever.   HENT: Negative for congestion, ear pain, hearing loss and sore throat.    Eyes: Negative for pain and visual disturbance.   Respiratory: Negative for cough and shortness of breath.    Cardiovascular: Negative for chest pain, palpitations and peripheral edema.   Gastrointestinal: Negative for abdominal pain, constipation, diarrhea, heartburn, hematochezia and nausea.   Breasts:  Negative for tenderness, breast mass and discharge.   Genitourinary: Negative for dysuria, frequency, genital sores, hematuria, pelvic pain, urgency, vaginal bleeding and vaginal discharge.   Musculoskeletal: Negative  "for arthralgias, joint swelling and myalgias.   Skin: Negative for rash.   Neurological: Negative for dizziness, weakness, headaches and paresthesias.   Psychiatric/Behavioral: Negative for mood changes. The patient is nervous/anxious.      CONSTITUTIONAL: NEGATIVE for fever, chills, change in weight  INTEGUMENTARU/SKIN: NEGATIVE for worrisome rashes, moles or lesions  EYES: NEGATIVE for vision changes or irritation  ENT: NEGATIVE for ear, mouth and throat problems  RESP: NEGATIVE for significant cough or SOB  BREAST: NEGATIVE for masses, tenderness or discharge  CV: NEGATIVE for chest pain, palpitations or peripheral edema  GI: NEGATIVE for nausea, abdominal pain, heartburn, or change in bowel habits  : NEGATIVE for unusual urinary or vaginal symptoms. Periods are regular.  MUSCULOSKELETAL: NEGATIVE for significant arthralgias or myalgia  NEURO: NEGATIVE for weakness, dizziness or paresthesias  PSYCHIATRIC: NEGATIVE for changes in mood or affect     OBJECTIVE:   /75   Pulse 97   Temp 98.8  F (37.1  C) (Oral)   Resp 17   Ht 1.638 m (5' 4.5\")   Wt 52.6 kg (116 lb)   LMP 07/12/2022   SpO2 97%   BMI 19.60 kg/m    Physical Exam  GENERAL: healthy, alert and no distress  EYES: Eyes grossly normal to inspection, PERRL and conjunctivae and sclerae normal  HENT: ear canals and TM's normal, nose and mouth without ulcers or lesions  NECK: no adenopathy, no asymmetry, masses, or scars and thyroid normal to palpation  RESP: lungs clear to auscultation - no rales, rhonchi or wheezes  BREAST: normal without masses, tenderness or nipple discharge and no palpable axillary masses or adenopathy  CV: regular rate and rhythm, normal S1 S2, no S3 or S4, no murmur, click or rub, no peripheral edema and peripheral pulses strong  ABDOMEN: soft, nontender, no hepatosplenomegaly, no masses and bowel sounds normal   (female): normal female external genitalia, normal urethral meatus, vaginal mucosa pink, moist, well rugated, " and normal cervix/adnexa/uterus without masses or discharge  MS: no gross musculoskeletal defects noted, no edema  SKIN: no suspicious lesions or rashes  NEURO: Normal strength and tone, mentation intact and speech normal  PSYCH: mentation appears normal, affect normal/bright    Diagnostic Test Results:  Labs reviewed in Epic  EKG: NSR    ASSESSMENT/PLAN:   (Z00.00) Routine general medical examination at a health care facility  (primary encounter diagnosis)  Comment:   Plan:     (F33.0) Mild recurrent major depression (H)  Comment: well controlled per pt on zoloft  Plan: sertraline (ZOLOFT) 50 MG tablet            (F41.9) Anxiety  Comment: discussed if w/u negative, could consider increasing this dose  Plan: sertraline (ZOLOFT) 50 MG tablet            (R00.2) Palpitations  Comment: normal EKG> will get zio patch and labwork  Plan: **TSH with free T4 reflex FUTURE 2mo, **CBC         with platelets FUTURE 2mo, **Comprehensive         metabolic panel FUTURE 2mo, **Vitamin B12         FUTURE 2mo, **Vitamin D Deficiency FUTURE 2mo,         **Folate FUTURE 2mo, Thyroxine total, T3, Free,        EKG 12-lead complete w/read - Clinics, Adult         Leadless EKG Monitor 3 to 7 Days           (R53.83) Other fatigue  Comment: await labwork  Plan: **TSH with free T4 reflex FUTURE 2mo, **CBC         with platelets FUTURE 2mo, **Comprehensive         metabolic panel FUTURE 2mo, **Vitamin B12         FUTURE 2mo, **Vitamin D Deficiency FUTURE 2mo,         **Folate FUTURE 2mo, Thyroxine total, T3, Free            (R22.1) Neck fullness  Comment: await US  Plan: US Thyroid            (L65.9) Hair loss  Comment: await labwork  Plan: **TSH with free T4 reflex FUTURE 2mo, **CBC         with platelets FUTURE 2mo, **Comprehensive         metabolic panel FUTURE 2mo, **Vitamin B12         FUTURE 2mo, **Vitamin D Deficiency FUTURE 2mo,         **Folate FUTURE 2mo, Thyroxine total, T3, Free            (L24.5) Irritant contact dermatitis due to  "other chemical products  Comment: refill as needed  Plan: triamcinolone (KENALOG) 0.1 % external cream            (Z12.4) Screening for malignant neoplasm of cervix  Comment:   Plan: Pap screen with HPV - recommended age 30 - 65         years            (Z13.220) Screening cholesterol level  Comment:   Plan: Lipid panel reflex to direct LDL Fasting              Patient has been advised of split billing requirements and indicates understanding: Yes    COUNSELING:  Reviewed preventive health counseling, as reflected in patient instructions       Regular exercise       Healthy diet/nutrition       Vision screening    Estimated body mass index is 19.6 kg/m  as calculated from the following:    Height as of this encounter: 1.638 m (5' 4.5\").    Weight as of this encounter: 52.6 kg (116 lb).        She reports that she quit smoking about 6 years ago. Her smoking use included cigarettes. She has never used smokeless tobacco.      Counseling Resources:  ATP IV Guidelines  Pooled Cohorts Equation Calculator  Breast Cancer Risk Calculator  BRCA-Related Cancer Risk Assessment: FHS-7 Tool  FRAX Risk Assessment  ICSI Preventive Guidelines  Dietary Guidelines for Americans, 2010  USDA's MyPlate  ASA Prophylaxis  Lung CA Screening    Tammy Crawford MD  [facility]  "

## 2022-07-26 LAB — T3FREE SERPL-MCNC: 2.6 PG/ML (ref 2–4.4)

## 2022-07-27 LAB
BKR LAB AP GYN ADEQUACY: NORMAL
BKR LAB AP GYN INTERPRETATION: NORMAL
BKR LAB AP HPV REFLEX: NORMAL
BKR LAB AP LMP: NORMAL
BKR LAB AP PREVIOUS ABNORMAL: NORMAL
PATH REPORT.COMMENTS IMP SPEC: NORMAL
PATH REPORT.COMMENTS IMP SPEC: NORMAL
PATH REPORT.RELEVANT HX SPEC: NORMAL

## 2022-07-28 LAB
HUMAN PAPILLOMA VIRUS 16 DNA: NEGATIVE
HUMAN PAPILLOMA VIRUS 18 DNA: NEGATIVE
HUMAN PAPILLOMA VIRUS FINAL DIAGNOSIS: NORMAL
HUMAN PAPILLOMA VIRUS OTHER HR: NEGATIVE

## 2022-08-08 ENCOUNTER — ANCILLARY PROCEDURE (OUTPATIENT)
Dept: ULTRASOUND IMAGING | Facility: CLINIC | Age: 42
End: 2022-08-08
Attending: FAMILY MEDICINE
Payer: COMMERCIAL

## 2022-08-08 DIAGNOSIS — R22.1 NECK FULLNESS: ICD-10-CM

## 2022-08-08 PROCEDURE — 76536 US EXAM OF HEAD AND NECK: CPT | Mod: TC | Performed by: RADIOLOGY

## 2022-08-11 ENCOUNTER — TELEPHONE (OUTPATIENT)
Dept: FAMILY MEDICINE | Facility: CLINIC | Age: 42
End: 2022-08-11

## 2022-12-04 DIAGNOSIS — L24.5 IRRITANT CONTACT DERMATITIS DUE TO OTHER CHEMICAL PRODUCTS: ICD-10-CM

## 2022-12-04 RX ORDER — TRIAMCINOLONE ACETONIDE 1 MG/G
CREAM TOPICAL
Qty: 80 G | Refills: 0 | Status: SHIPPED | OUTPATIENT
Start: 2022-12-04

## 2023-01-07 ENCOUNTER — HEALTH MAINTENANCE LETTER (OUTPATIENT)
Age: 43
End: 2023-01-07

## 2023-04-22 ENCOUNTER — HEALTH MAINTENANCE LETTER (OUTPATIENT)
Age: 43
End: 2023-04-22

## 2023-06-26 ENCOUNTER — PATIENT OUTREACH (OUTPATIENT)
Dept: CARE COORDINATION | Facility: CLINIC | Age: 43
End: 2023-06-26
Payer: COMMERCIAL

## 2023-07-10 ENCOUNTER — PATIENT OUTREACH (OUTPATIENT)
Dept: CARE COORDINATION | Facility: CLINIC | Age: 43
End: 2023-07-10
Payer: COMMERCIAL

## 2023-08-20 DIAGNOSIS — F33.0 MILD RECURRENT MAJOR DEPRESSION (H): ICD-10-CM

## 2023-08-20 DIAGNOSIS — F41.9 ANXIETY: ICD-10-CM

## 2023-09-23 ENCOUNTER — HEALTH MAINTENANCE LETTER (OUTPATIENT)
Age: 43
End: 2023-09-23

## 2023-12-22 ENCOUNTER — MYC REFILL (OUTPATIENT)
Dept: FAMILY MEDICINE | Facility: CLINIC | Age: 43
End: 2023-12-22
Payer: COMMERCIAL

## 2023-12-22 DIAGNOSIS — F33.0 MILD RECURRENT MAJOR DEPRESSION (H): ICD-10-CM

## 2023-12-22 DIAGNOSIS — F41.9 ANXIETY: ICD-10-CM

## 2024-01-24 DIAGNOSIS — F33.0 MILD RECURRENT MAJOR DEPRESSION (H): ICD-10-CM

## 2024-01-24 DIAGNOSIS — F41.9 ANXIETY: ICD-10-CM

## 2024-01-25 ENCOUNTER — MYC REFILL (OUTPATIENT)
Dept: FAMILY MEDICINE | Facility: CLINIC | Age: 44
End: 2024-01-25
Payer: COMMERCIAL

## 2024-01-25 DIAGNOSIS — F33.0 MILD RECURRENT MAJOR DEPRESSION (H): ICD-10-CM

## 2024-01-25 DIAGNOSIS — F41.9 ANXIETY: ICD-10-CM

## 2024-01-25 NOTE — TELEPHONE ENCOUNTER
LM to schedule appt. Per provider patient can have enough to get to appt if she schedules.    Fidelia Horner,    [facility]

## 2024-02-19 ENCOUNTER — VIRTUAL VISIT (OUTPATIENT)
Dept: FAMILY MEDICINE | Facility: CLINIC | Age: 44
End: 2024-02-19
Payer: COMMERCIAL

## 2024-02-19 ENCOUNTER — ORDERS ONLY (AUTO-RELEASED) (OUTPATIENT)
Dept: FAMILY MEDICINE | Facility: CLINIC | Age: 44
End: 2024-02-19

## 2024-02-19 ENCOUNTER — OFFICE VISIT (OUTPATIENT)
Dept: OPTOMETRY | Facility: CLINIC | Age: 44
End: 2024-02-19
Payer: COMMERCIAL

## 2024-02-19 DIAGNOSIS — H52.223 REGULAR ASTIGMATISM OF BOTH EYES: ICD-10-CM

## 2024-02-19 DIAGNOSIS — Z12.31 VISIT FOR SCREENING MAMMOGRAM: ICD-10-CM

## 2024-02-19 DIAGNOSIS — Z01.00 ROUTINE EYE EXAM: Primary | ICD-10-CM

## 2024-02-19 DIAGNOSIS — Z12.11 SCREEN FOR COLON CANCER: ICD-10-CM

## 2024-02-19 DIAGNOSIS — F41.9 ANXIETY: ICD-10-CM

## 2024-02-19 DIAGNOSIS — H52.13 MYOPIA OF BOTH EYES: ICD-10-CM

## 2024-02-19 DIAGNOSIS — F33.0 MILD RECURRENT MAJOR DEPRESSION (H): Primary | ICD-10-CM

## 2024-02-19 DIAGNOSIS — H52.4 PRESBYOPIA: ICD-10-CM

## 2024-02-19 PROCEDURE — 92014 COMPRE OPH EXAM EST PT 1/>: CPT | Performed by: OPTOMETRIST

## 2024-02-19 PROCEDURE — 99214 OFFICE O/P EST MOD 30 MIN: CPT | Mod: 95 | Performed by: FAMILY MEDICINE

## 2024-02-19 PROCEDURE — 92015 DETERMINE REFRACTIVE STATE: CPT | Performed by: OPTOMETRIST

## 2024-02-19 ASSESSMENT — REFRACTION_WEARINGRX
OD_SPHERE: -3.00
SPECS_TYPE: PAL
OD_CYLINDER: +1.25
OS_SPHERE: -3.00
OS_AXIS: 065
OS_ADD: +1.25
OD_ADD: +1.25
OS_CYLINDER: +1.00
OD_AXIS: 114

## 2024-02-19 ASSESSMENT — ANXIETY QUESTIONNAIRES
IF YOU CHECKED OFF ANY PROBLEMS ON THIS QUESTIONNAIRE, HOW DIFFICULT HAVE THESE PROBLEMS MADE IT FOR YOU TO DO YOUR WORK, TAKE CARE OF THINGS AT HOME, OR GET ALONG WITH OTHER PEOPLE: NOT DIFFICULT AT ALL
6. BECOMING EASILY ANNOYED OR IRRITABLE: NOT AT ALL
8. IF YOU CHECKED OFF ANY PROBLEMS, HOW DIFFICULT HAVE THESE MADE IT FOR YOU TO DO YOUR WORK, TAKE CARE OF THINGS AT HOME, OR GET ALONG WITH OTHER PEOPLE?: NOT DIFFICULT AT ALL
3. WORRYING TOO MUCH ABOUT DIFFERENT THINGS: NOT AT ALL
1. FEELING NERVOUS, ANXIOUS, OR ON EDGE: NOT AT ALL
5. BEING SO RESTLESS THAT IT IS HARD TO SIT STILL: NOT AT ALL
2. NOT BEING ABLE TO STOP OR CONTROL WORRYING: NOT AT ALL
4. TROUBLE RELAXING: NOT AT ALL
7. FEELING AFRAID AS IF SOMETHING AWFUL MIGHT HAPPEN: NOT AT ALL
GAD7 TOTAL SCORE: 0
GAD7 TOTAL SCORE: 0
7. FEELING AFRAID AS IF SOMETHING AWFUL MIGHT HAPPEN: NOT AT ALL
GAD7 TOTAL SCORE: 0

## 2024-02-19 ASSESSMENT — KERATOMETRY
OD_K1POWER_DIOPTERS: 45.25
OS_K2POWER_DIOPTERS: 46.00
OS_AXISANGLE_DEGREES: 71
OD_K2POWER_DIOPTERS: 46.25
OS_AXISANGLE2_DEGREES: 161
OD_AXISANGLE2_DEGREES: 19
OS_K1POWER_DIOPTERS: 45.25
OD_AXISANGLE_DEGREES: 109

## 2024-02-19 ASSESSMENT — REFRACTION_MANIFEST
OD_AXIS: 112
OD_CYLINDER: +1.00
OD_SPHERE: -3.00
OD_SPHERE: -2.75
OS_SPHERE: -2.75
OS_ADD: +1.75
OD_ADD: +1.75
OS_AXIS: 063
OS_CYLINDER: +1.00
OD_AXIS: 120
METHOD_AUTOREFRACTION: 1
OS_SPHERE: -2.75
OS_CYLINDER: +0.25
OS_AXIS: 055
OD_CYLINDER: +1.25

## 2024-02-19 ASSESSMENT — SLIT LAMP EXAM - LIDS
COMMENTS: NORMAL
COMMENTS: NORMAL

## 2024-02-19 ASSESSMENT — CONF VISUAL FIELD
OS_NORMAL: 1
OS_SUPERIOR_TEMPORAL_RESTRICTION: 0
OD_SUPERIOR_NASAL_RESTRICTION: 0
OD_SUPERIOR_TEMPORAL_RESTRICTION: 0
OD_INFERIOR_TEMPORAL_RESTRICTION: 0
OS_INFERIOR_TEMPORAL_RESTRICTION: 0
OS_SUPERIOR_NASAL_RESTRICTION: 0
OS_INFERIOR_NASAL_RESTRICTION: 0
OD_INFERIOR_NASAL_RESTRICTION: 0
OD_NORMAL: 1

## 2024-02-19 ASSESSMENT — TONOMETRY
OS_IOP_MMHG: 7
OD_IOP_MMHG: 7
IOP_METHOD: APPLANATION

## 2024-02-19 ASSESSMENT — VISUAL ACUITY
OS_CC: 20/20
METHOD: SNELLEN - LINEAR
OD_CC: 20/20
OD_CC: 20/40
CORRECTION_TYPE: GLASSES
OS_CC: 20/50

## 2024-02-19 ASSESSMENT — PATIENT HEALTH QUESTIONNAIRE - PHQ9
SUM OF ALL RESPONSES TO PHQ QUESTIONS 1-9: 0
10. IF YOU CHECKED OFF ANY PROBLEMS, HOW DIFFICULT HAVE THESE PROBLEMS MADE IT FOR YOU TO DO YOUR WORK, TAKE CARE OF THINGS AT HOME, OR GET ALONG WITH OTHER PEOPLE: NOT DIFFICULT AT ALL
SUM OF ALL RESPONSES TO PHQ QUESTIONS 1-9: 0

## 2024-02-19 ASSESSMENT — CUP TO DISC RATIO
OD_RATIO: 0.2
OS_RATIO: 0.2

## 2024-02-19 ASSESSMENT — EXTERNAL EXAM - LEFT EYE: OS_EXAM: NORMAL

## 2024-02-19 ASSESSMENT — EXTERNAL EXAM - RIGHT EYE: OD_EXAM: NORMAL

## 2024-02-19 ASSESSMENT — ENCOUNTER SYMPTOMS: NERVOUS/ANXIOUS: 1

## 2024-02-19 NOTE — PROGRESS NOTES
"    Instructions Relayed to Patient by Virtual Roomer:     Patient is active on [employer]:   Relayed following to patient: \"It looks like you are active on Live[employer], are you able to join the visit this way? If not, do you need us to send you a link now or would you like your provider to send a link via text or email when they are ready to initiate the visit?\"    Reminded patient to ensure they were logged on to virtual visit by arrival time listed. Documented in appointment notes if patient had flexibility to initiate visit sooner than arrival time. If pediatric virtual visit, ensured pediatric patient along with parent/guardian will be present for video visit.     Patient offered the website [email or web address] and/or phone number to [employer] Help line: [phone]      Lisa is a 43 year old who is being evaluated via a billable video visit.      How would you like to obtain your AVS? [employer]  If the video visit is dropped, the invitation should be resent by: Text to cell phone: [phone]  Will anyone else be joining your video visit? No          Assessment & Plan     Mild recurrent major depression (H24)  Doing well on meds, will refill  - sertraline (ZOLOFT) 50 MG tablet; Take 1 tablet (50 mg) by mouth daily    Anxiety  same  - sertraline (ZOLOFT) 50 MG tablet; Take 1 tablet (50 mg) by mouth daily    Screen for colon cancer  Pt agreeable to cologuard  No family history of colon cancer  - COLOGUARD(EXACT SCIENCES); Future    Visit for screening mammogram  Pt to schedule  - MA SCREENING DIGITAL BILAT - Future  (s+30); Future                  Subjective   Lisa is a 43 year old, presenting for the following health issues:  Depression and Anxiety (/)      2/19/2024     6:49 AM   Additional Questions   Roomed by Flores     Anxiety    History of Present Illness       Mental Health Follow-up:  Patient presents to follow-up on Depression & Anxiety.Patient's depression since last visit has been:  " Good  The patient is not having other symptoms associated with depression.  Patient's anxiety since last visit has been:  Good  The patient is not having other symptoms associated with anxiety.  Any significant life events: No  Patient is not feeling anxious or having panic attacks.  Patient has no concerns about alcohol or drug use.    She eats 0-1 servings of fruits and vegetables daily.She consumes 1 sweetened beverage(s) daily.She exercises with enough effort to increase her heart rate 9 or less minutes per day.  She exercises with enough effort to increase her heart rate 3 or less days per week.   She is taking medications regularly.       Pt here for refill of her zoloft  It is working well  She has no concerns.   Overdue for physical and will try to schedule        Review of Systems  Constitutional, HEENT, cardiovascular, pulmonary, gi and gu systems are negative, except as otherwise noted.      Objective           Vitals:  No vitals were obtained today due to virtual visit.    Physical Exam   GENERAL: alert and no distress  EYES: Eyes grossly normal to inspection.  No discharge or erythema, or obvious scleral/conjunctival abnormalities.  RESP: No audible wheeze, cough, or visible cyanosis.    SKIN: Visible skin clear. No significant rash, abnormal pigmentation or lesions.  NEURO: Cranial nerves grossly intact.  Mentation and speech appropriate for age.  PSYCH: Appropriate affect, tone, and pace of words    No results found for this or any previous visit (from the past 24 hour(s)).      Video-Visit Details    Type of service:  Video Visit     Originating Location (pt. Location): Home    Distant Location (provider location):  On-site  Platform used for Video Visit: [employer]  Signed Electronically by: Tammy Crawford MD

## 2024-02-19 NOTE — PATIENT INSTRUCTIONS
Fill glasses prescription  Allow 2 weeks to adapt to change in glasses  Return in 1 year for eye exam    Yeimy Cheung O.D.  [facility]  [address] [place] [address] [phone]

## 2024-02-19 NOTE — PROGRESS NOTES
Chief Complaint   Patient presents with    Annual Eye Exam      Accompanied by daughter  Last Eye Exam: 2021  Dilated Previously: Yes    What are you currently using to see?  glasses       Distance Vision Acuity: Satisfied with vision    Near Vision Acuity: Not satisfied     Eye Comfort: watery and itchy during allergy season   Do you use eye drops? : Yes: allergy drops as needed  Occupation or Hobbies: Computer work     Ramo David - Optometric Assistant          Medical, surgical and family histories reviewed and updated 2/19/2024.       OBJECTIVE: See Ophthalmology exam    ASSESSMENT:    ICD-10-CM    1. Routine eye exam  Z01.00       2. Myopia of both eyes  H52.13       3. Regular astigmatism of both eyes  H52.223       4. Presbyopia  H52.4           PLAN:     Patient Instructions   Fill glasses prescription  Allow 2 weeks to adapt to change in glasses  Return in 1 year for eye exam    Yeimy Cheung O.D.  [facility]  [address], [place] [id number] [phone]

## 2024-02-19 NOTE — LETTER
2/19/2024         RE: Shruthi Bedoya  [address] [address]        Dear Colleague,    Thank you for referring your patient, Shruthi Bedoya, to the [facility]. Please see a copy of my visit note below.    Chief Complaint   Patient presents with     Annual Eye Exam      Accompanied by daughter  Last Eye Exam: 2021  Dilated Previously: Yes    What are you currently using to see?  glasses       Distance Vision Acuity: Satisfied with vision    Near Vision Acuity: Not satisfied     Eye Comfort: watery and itchy during allergy season   Do you use eye drops? : Yes: allergy drops as needed  Occupation or Hobbies: Computer work     Ramo David - Optometric Assistant          Medical, surgical and family histories reviewed and updated 2/19/2024.       OBJECTIVE: See Ophthalmology exam    ASSESSMENT:    ICD-10-CM    1. Routine eye exam  Z01.00       2. Myopia of both eyes  H52.13       3. Regular astigmatism of both eyes  H52.223       4. Presbyopia  H52.4           PLAN:     Patient Instructions   Fill glasses prescription  Allow 2 weeks to adapt to change in glasses  Return in 1 year for eye exam    Yeimy Cheung O.D.  [facility]  [address] [place], [place] [id number] [phone]        Again, thank you for allowing me to participate in the care of your patient.        Sincerely,        Yeimy Cheung, OD

## 2024-03-11 LAB — NONINV COLON CA DNA+OCC BLD SCRN STL QL: NEGATIVE

## 2024-11-16 ENCOUNTER — HEALTH MAINTENANCE LETTER (OUTPATIENT)
Age: 44
End: 2024-11-16

## 2025-05-10 ENCOUNTER — HEALTH MAINTENANCE LETTER (OUTPATIENT)
Age: 45
End: 2025-05-10

## 2025-06-17 DIAGNOSIS — F41.9 ANXIETY: ICD-10-CM

## 2025-06-17 DIAGNOSIS — F33.0 MILD RECURRENT MAJOR DEPRESSION: ICD-10-CM

## 2025-07-21 DIAGNOSIS — F33.0 MILD RECURRENT MAJOR DEPRESSION: ICD-10-CM

## 2025-07-21 DIAGNOSIS — F41.9 ANXIETY: ICD-10-CM
